# Patient Record
Sex: MALE | Race: WHITE | NOT HISPANIC OR LATINO | Employment: FULL TIME | URBAN - METROPOLITAN AREA
[De-identification: names, ages, dates, MRNs, and addresses within clinical notes are randomized per-mention and may not be internally consistent; named-entity substitution may affect disease eponyms.]

---

## 2018-05-15 ENCOUNTER — OFFICE VISIT (OUTPATIENT)
Dept: ENDOCRINOLOGY | Facility: HOSPITAL | Age: 68
End: 2018-05-15
Payer: MEDICARE

## 2018-05-15 VITALS
HEART RATE: 80 BPM | HEIGHT: 75 IN | WEIGHT: 251.6 LBS | DIASTOLIC BLOOD PRESSURE: 82 MMHG | SYSTOLIC BLOOD PRESSURE: 134 MMHG | BODY MASS INDEX: 31.28 KG/M2

## 2018-05-15 DIAGNOSIS — E89.0 POSTOPERATIVE HYPOTHYROIDISM: ICD-10-CM

## 2018-05-15 DIAGNOSIS — C73 PAPILLARY THYROID CARCINOMA (HCC): Primary | ICD-10-CM

## 2018-05-15 PROCEDURE — 99204 OFFICE O/P NEW MOD 45 MIN: CPT | Performed by: INTERNAL MEDICINE

## 2018-05-15 RX ORDER — LEVOTHYROXINE SODIUM 0.2 MG/1
200 TABLET ORAL DAILY
Qty: 90 TABLET | Refills: 3
Start: 2018-05-15 | End: 2018-05-15 | Stop reason: SDUPTHER

## 2018-05-15 RX ORDER — FINASTERIDE 5 MG/1
5 TABLET, FILM COATED ORAL DAILY
COMMUNITY
Start: 2018-04-29

## 2018-05-15 RX ORDER — LEVOTHYROXINE SODIUM 0.2 MG/1
200 TABLET ORAL DAILY
Qty: 90 TABLET | Refills: 3 | Status: SHIPPED | OUTPATIENT
Start: 2018-05-15 | End: 2019-05-18 | Stop reason: SDUPTHER

## 2018-05-15 RX ORDER — LEVOTHYROXINE SODIUM 25 MCG
TABLET ORAL
Qty: 30 TABLET | Refills: 0
Start: 2018-05-15 | End: 2018-05-15

## 2018-05-15 RX ORDER — LEVOTHYROXINE SODIUM 25 MCG
TABLET ORAL
COMMUNITY
Start: 2018-04-23 | End: 2018-05-15 | Stop reason: SDUPTHER

## 2018-05-15 NOTE — LETTER
May 15, 2018     Elevaashley Theodore   [address] [facility]  [facility] [address]  [address] [address]    Patient: Estuardo Mazariegos   YOB: 1950   Date of Visit: 5/15/2018       Dear Dr Sarita Stout: Thank you for referring Estuardo Mazariegos to me for evaluation  Below are my notes for this consultation  If you have questions, please do not hesitate to call me  I look forward to following your patient along with you  Sincerely,        Merline Bingham DO        CC: No Recipients  Merline Bingham DO  5/15/2018  1:30 PM  Sign at close encounter  5/15/2018    Assessment/Plan      Diagnoses and all orders for this visit:    Papillary thyroid carcinoma ([facility] [address] )  -     TSH, 3rd generation Lab Collect; Future  -     T4, free Lab Collect; Future  -     Thyroglobulin; Future  -     Thyroglobulin w/ab Lab Collect; Future  -     TSH, 3rd generation Lab Collect; Future  -     T4, free Lab Collect; Future  -     Thyroglobulin; Future  -     Anti-thyroglobulin antibody; Future  -     Comprehensive metabolic panel; Future    Postoperative hypothyroidism  -     Discontinue: levothyroxine (SYNTHROID) 200 mcg tablet; Take 1 tablet (200 mcg total) by mouth daily 1 tab daily  BRAND NECESSARY  -     Discontinue: SYNTHROID 25 MCG tablet; 1 tab daily except for Wednesdays and Sundays  BRAND NECESSARY  -     levothyroxine (SYNTHROID) 200 mcg tablet; Take 1 tablet (200 mcg total) by mouth daily 1 tab daily  BRAND NECESSARY  -     TSH, 3rd generation Lab Collect; Future  -     T4, free Lab Collect; Future  -     Thyroglobulin; Future  -     Anti-thyroglobulin antibody; Future  -     Comprehensive metabolic panel; Future    Other orders  -     finasteride (PROSCAR) 5 mg tablet; Take 5 mg by mouth daily  -     Discontinue: SYNTHROID 25 MCG tablet; Assessment/Plan:  1  Papillary thyroid cancer:  Surgical pathology listed below  Status post thyroidectomy and radioactive iodine  Thyroglobulin has remained undetectable    Has not had an ultrasound since about 2014 which has not shown any residual recurrent disease  I have suggested we back off on his TSH goal for TSH is 0 1-0 5  He will take Synthroid brand 200 mcg daily now  Repeat TSH, free T4, thyroglobulin antibody, thyroglobulin quantitative in about 3 months  Will call with these results for any additional adjustments  Plan to follow-up in 1 year with labs just prior including a TSH, free T4, thyroglobulin antibody, thyroglobulin quantitative, CMP  2   Postoperative hypothyroidism:  Plan per number 1  CC:  Thyroid cancer and hypothyroidism    History of Present Illness     HPI: Tiarra Du is a 74y o  year old male with history of thyroid nodules, thyroid cancer, postoperative hypothyroidism, hypertension, BPH who presents to [facility] care  Previous patient doctor Jaeger  The patient had thyroid nodules and had a 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  He then had another nodule that grew and was biopsied with concern for papillary thyroid cancer  He underwent thyroidectomy in 2011 at [facility]  Surgical pathology is listed below  He then received about 150 mCi of I 131 for him than ablation  He was then followed and has had no evidence of recurrent disease  He presents today feeling well denies any significant symptoms  He denies any symptoms of hypothyroidism, neck compression, hyperthyroidism  No family history of thyroid cancer  He takes Synthroid brand 200 mcg tablets and adds a 25 mcg tablet 5 days of the week  Review of Systems   Constitutional: Negative for chills, fatigue and fever  HENT: Negative for sore throat, trouble swallowing and voice change  Eyes: Negative for visual disturbance  Respiratory: Negative for shortness of breath  Cardiovascular: Negative for chest pain, palpitations and leg swelling  Gastrointestinal: Negative for abdominal pain, nausea and vomiting     Endocrine: Negative for heat intolerance, polydipsia and polyuria  Musculoskeletal: Negative for arthralgias and myalgias  Skin: Negative for rash  Neurological: Negative for dizziness, tremors and weakness  Hematological: Negative for adenopathy  Psychiatric/Behavioral: Negative for agitation and confusion  Historical Information   Past Medical History:   Diagnosis Date    Hypertension     Prostate enlargement      Past Surgical History:   Procedure Laterality Date    TOTAL THYROIDECTOMY       Social History   History   Alcohol Use    Yes     History   Drug Use No     History   Smoking Status    Former Smoker   Smokeless Tobacco    Never Used     Family History:   Family History   Problem Relation Age of Onset    No Known Problems Mother     No Known Problems Father        Meds/Allergies   Current Outpatient Prescriptions   Medication Sig Dispense Refill    amLODIPine (NORVASC) 10 mg tablet Take 10 mg by mouth daily   doxazosin (CARDURA) 4 mg tablet Take 4 mg by mouth daily at bedtime   finasteride (PROSCAR) 5 mg tablet Take 5 mg by mouth daily      pravastatin (PRAVACHOL) 20 mg tablet Take 20 mg by mouth daily   levothyroxine (SYNTHROID) 200 mcg tablet Take 1 tablet (200 mcg total) by mouth daily 1 tab daily  BRAND NECESSARY  90 tablet 3    ustekinumab (STELARA) 90 mg/mL subcutaneous injection Inject 90 mg under the skin once  No current facility-administered medications for this visit  No Known Allergies    Objective   Vitals: Blood pressure 134/82, pulse 80, height 6' 3" (1 905 m), weight 114 kg (251 lb 9 6 oz)  Invasive Devices     Drain            Urethral Catheter Coude 625 days                Physical Exam   Constitutional: He is oriented to person, place, and time  He appears well-developed and well-nourished  No distress  HENT:   Head: Normocephalic and atraumatic  Mouth/Throat: No oropharyngeal exudate     Eyes: Conjunctivae and EOM are normal  Pupils are equal, round, and reactive to light  No scleral icterus  Neck: Normal range of motion  Neck supple  No nodules in thyroid bed palpable  Cardiovascular: Normal rate and regular rhythm  No murmur heard  Pulmonary/Chest: Effort normal and breath sounds normal  He has no wheezes  Abdominal: Soft  Bowel sounds are normal  He exhibits no distension  There is no tenderness  Musculoskeletal: Normal range of motion  He exhibits edema (trace b/l )  Lymphadenopathy:     He has no cervical adenopathy  Neurological: He is alert and oriented to person, place, and time  He exhibits normal muscle tone  Skin: Skin is warm and dry  No rash noted  He is not diaphoretic  Psychiatric: He has a normal mood and affect  His behavior is normal        The history was obtained from the review of the chart and from the patient  Lab Results:      Labs from [address] [place] [address] done on 05/04/2018:  Glucose 94, BUN 16, creatinine 0 4, GFR 85, sodium 140, potassium 4 2, calcium 8 9, albumin 3 9, liver function within normal limits, TSH 0 03, free T4 2 2, thyroglobulin antibodies less than 1, thyroglobulin quantitative less than 0 1     12/10/14:  NECK ULTRASOUND   INDICATION-   History of thyroid carcinoma  COMPARISON-  Lymph node mapping November 12, 2013  FINDINGS-   Ultrasound of the thyroidectomy bed and cervical lymph node chains was   again performed with a high frequency linear transducer  There is no suspicion of recurrent mass in the thyroidectomy bed  Lymph nodes maintain normal morphologic contour, echogenicity and short   axis dimensions of less than 0 7 cm  No evidence for   microcalcification or focal nodularity  IMPRESSION-     No evidence of recurrent or metastatic disease     Transcribed on- [id number]           [facility], RAD DO        [place] Radiologist- BARBY Gibson DO        Electronically Signed- BARBY Gibson DO        Released Date Time- 12/10/14 8718     Surgical pathology from [place] on 05/16/2011:  3 5 cm right lobe papillary thyroid cancer confined to the thyroid parenchyma without angiolymphatic invasion and with surgical resection margins free of cancer  There also follicular adenoma and hyperplastic nodules  This was unifocal tumor  Nose classic papillary  No tumor capsular invasion or lymphovascular invasion  On 07/21/2011 at [facility] he had a post I 131 therapy whole-body scan that showed residual thyroid tissue in the right neck  No distant Mets seen  No future appointments

## 2018-05-15 NOTE — PROGRESS NOTES
5/15/2018    Assessment/Plan      Diagnoses and all orders for this visit:    Papillary thyroid carcinoma ([facility] [address] )  -     TSH, 3rd generation Lab Collect; Future  -     T4, free Lab Collect; Future  -     Thyroglobulin; Future  -     Thyroglobulin w/ab Lab Collect; Future  -     TSH, 3rd generation Lab Collect; Future  -     T4, free Lab Collect; Future  -     Thyroglobulin; Future  -     Anti-thyroglobulin antibody; Future  -     Comprehensive metabolic panel; Future    Postoperative hypothyroidism  -     Discontinue: levothyroxine (SYNTHROID) 200 mcg tablet; Take 1 tablet (200 mcg total) by mouth daily 1 tab daily  BRAND NECESSARY  -     Discontinue: SYNTHROID 25 MCG tablet; 1 tab daily except for Wednesdays and Sundays  BRAND NECESSARY  -     levothyroxine (SYNTHROID) 200 mcg tablet; Take 1 tablet (200 mcg total) by mouth daily 1 tab daily  BRAND NECESSARY  -     TSH, 3rd generation Lab Collect; Future  -     T4, free Lab Collect; Future  -     Thyroglobulin; Future  -     Anti-thyroglobulin antibody; Future  -     Comprehensive metabolic panel; Future    Other orders  -     finasteride (PROSCAR) 5 mg tablet; Take 5 mg by mouth daily  -     Discontinue: SYNTHROID 25 MCG tablet; Assessment/Plan:  1  Papillary thyroid cancer:  Surgical pathology listed below  Status post thyroidectomy and radioactive iodine  Thyroglobulin has remained undetectable  Has not had an ultrasound since about 2014 which has not shown any residual recurrent disease  I have suggested we back off on his TSH goal for TSH is 0 1-0 5  He will take Synthroid brand 200 mcg daily now  Repeat TSH, free T4, thyroglobulin antibody, thyroglobulin quantitative in about 3 months  Will call with these results for any additional adjustments  Plan to follow-up in 1 year with labs just prior including a TSH, free T4, thyroglobulin antibody, thyroglobulin quantitative, CMP  2   Postoperative hypothyroidism:  Plan per number 1        CC: Thyroid cancer and hypothyroidism    History of Present Illness     HPI: Delfin Carlton is a 74y o  year old male with history of thyroid nodules, thyroid cancer, postoperative hypothyroidism, hypertension, BPH who presents to establish care  Previous patient doctor Jaeger  The patient had thyroid nodules and had a 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  He then had another nodule that grew and was biopsied with concern for papillary thyroid cancer  He underwent thyroidectomy in 2011 at [facility]  Surgical pathology is listed below  He then received about 150 mCi of I 131 for him than ablation  He was then followed and has had no evidence of recurrent disease  He presents today feeling well denies any significant symptoms  He denies any symptoms of hypothyroidism, neck compression, hyperthyroidism  No family history of thyroid cancer  He takes Synthroid brand 200 mcg tablets and adds a 25 mcg tablet 5 days of the week  Review of Systems   Constitutional: Negative for chills, fatigue and fever  HENT: Negative for sore throat, trouble swallowing and voice change  Eyes: Negative for visual disturbance  Respiratory: Negative for shortness of breath  Cardiovascular: Negative for chest pain, palpitations and leg swelling  Gastrointestinal: Negative for abdominal pain, nausea and vomiting  Endocrine: Negative for heat intolerance, polydipsia and polyuria  Musculoskeletal: Negative for arthralgias and myalgias  Skin: Negative for rash  Neurological: Negative for dizziness, tremors and weakness  Hematological: Negative for adenopathy  Psychiatric/Behavioral: Negative for agitation and confusion         Historical Information   Past Medical History:   Diagnosis Date    Hypertension     Prostate enlargement      Past Surgical History:   Procedure Laterality Date    TOTAL THYROIDECTOMY       Social History   History   Alcohol Use    Yes     History Drug Use No     History   Smoking Status    Former Smoker   Smokeless Tobacco    Never Used     Family History:   Family History   Problem Relation Age of Onset    No Known Problems Mother     No Known Problems Father        Meds/Allergies   Current Outpatient Prescriptions   Medication Sig Dispense Refill    amLODIPine (NORVASC) 10 mg tablet Take 10 mg by mouth daily   doxazosin (CARDURA) 4 mg tablet Take 4 mg by mouth daily at bedtime   finasteride (PROSCAR) 5 mg tablet Take 5 mg by mouth daily      pravastatin (PRAVACHOL) 20 mg tablet Take 20 mg by mouth daily   levothyroxine (SYNTHROID) 200 mcg tablet Take 1 tablet (200 mcg total) by mouth daily 1 tab daily  BRAND NECESSARY  90 tablet 3    ustekinumab (STELARA) 90 mg/mL subcutaneous injection Inject 90 mg under the skin once  No current facility-administered medications for this visit  No Known Allergies    Objective   Vitals: Blood pressure 134/82, pulse 80, height 6' 3" (1 905 m), weight 114 kg (251 lb 9 6 oz)  Invasive Devices     Drain            Urethral Catheter Coude 625 days                Physical Exam   Constitutional: He is oriented to person, place, and time  He appears well-developed and well-nourished  No distress  HENT:   Head: Normocephalic and atraumatic  Mouth/Throat: No oropharyngeal exudate  Eyes: Conjunctivae and EOM are normal  Pupils are equal, round, and reactive to light  No scleral icterus  Neck: Normal range of motion  Neck supple  No nodules in thyroid bed palpable  Cardiovascular: Normal rate and regular rhythm  No murmur heard  Pulmonary/Chest: Effort normal and breath sounds normal  He has no wheezes  Abdominal: Soft  Bowel sounds are normal  He exhibits no distension  There is no tenderness  Musculoskeletal: Normal range of motion  He exhibits edema (trace b/l )  Lymphadenopathy:     He has no cervical adenopathy     Neurological: He is alert and oriented to person, place, and time  He exhibits normal muscle tone  Skin: Skin is warm and dry  No rash noted  He is not diaphoretic  Psychiatric: He has a normal mood and affect  His behavior is normal        The history was obtained from the review of the chart and from the patient  Lab Results:      Labs from [address] [place] [address] done on 05/04/2018:  Glucose 94, BUN 16, creatinine 0 4, GFR 85, sodium 140, potassium 4 2, calcium 8 9, albumin 3 9, liver function within normal limits, TSH 0 03, free T4 2 2, thyroglobulin antibodies less than 1, thyroglobulin quantitative less than 0 1     12/10/14:  NECK ULTRASOUND   INDICATION-   History of thyroid carcinoma  COMPARISON-  Lymph node mapping November 12, 2013  FINDINGS-   Ultrasound of the thyroidectomy bed and cervical lymph node chains was   again performed with a high frequency linear transducer  There is no suspicion of recurrent mass in the thyroidectomy bed  Lymph nodes maintain normal morphologic contour, echogenicity and short   axis dimensions of less than 0 7 cm  No evidence for   microcalcification or focal nodularity  IMPRESSION-     No evidence of recurrent or metastatic disease  Transcribed on- [id number]           [address] [facility], RAD DO        [place] Radiologist- BARBY Moss DO        Electronically Signed- BARBY Moss DO        Released Date Time- 12/10/14 1118     Surgical pathology from [facility] on 05/16/2011:  3 5 cm right lobe papillary thyroid cancer confined to the thyroid parenchyma without angiolymphatic invasion and with surgical resection margins free of cancer  There also follicular adenoma and hyperplastic nodules  This was unifocal tumor  Nose classic papillary  No tumor capsular invasion or lymphovascular invasion  On 07/21/2011 at [facility] he had a post I 131 therapy whole-body scan that showed residual thyroid tissue in the right neck  No distant Mets seen  No future appointments

## 2018-09-10 LAB
T4 FREE SERPL-MCNC: 1.9 NG/DL (ref 0.8–1.8)
THYROGLOB AB SERPL-ACNC: <0.1 NG/ML
THYROGLOB AB SERPL-ACNC: <1 IU/ML
TSH SERPL-ACNC: 0.14 MIU/L (ref 0.4–4.5)

## 2019-05-10 LAB
ALBUMIN SERPL-MCNC: 4.1 G/DL (ref 3.6–5.1)
ALBUMIN/GLOB SERPL: 2 (CALC) (ref 1–2.5)
ALP SERPL-CCNC: 59 U/L (ref 40–115)
ALT SERPL-CCNC: 12 U/L (ref 9–46)
AST SERPL-CCNC: 13 U/L (ref 10–35)
BILIRUB SERPL-MCNC: 1.1 MG/DL (ref 0.2–1.2)
BUN SERPL-MCNC: 19 MG/DL (ref 7–25)
BUN/CREAT SERPL: ABNORMAL (CALC) (ref 6–22)
CALCIUM SERPL-MCNC: 9.1 MG/DL (ref 8.6–10.3)
CHLORIDE SERPL-SCNC: 104 MMOL/L (ref 98–110)
CO2 SERPL-SCNC: 25 MMOL/L (ref 20–32)
CREAT SERPL-MCNC: 1.08 MG/DL (ref 0.7–1.25)
GLOBULIN SER CALC-MCNC: 2.1 G/DL (CALC) (ref 1.9–3.7)
GLUCOSE SERPL-MCNC: 100 MG/DL (ref 65–99)
POTASSIUM SERPL-SCNC: 4.4 MMOL/L (ref 3.5–5.3)
PROT SERPL-MCNC: 6.2 G/DL (ref 6.1–8.1)
SL AMB EGFR AFRICAN AMERICAN: 82 ML/MIN/1.73M2
SL AMB EGFR NON AFRICAN AMERICAN: 71 ML/MIN/1.73M2
SODIUM SERPL-SCNC: 139 MMOL/L (ref 135–146)
T4 FREE SERPL-MCNC: 1.9 NG/DL (ref 0.8–1.8)
THYROGLOB AB SERPL-ACNC: <0.1 NG/ML
THYROGLOB AB SERPL-ACNC: <1 IU/ML
TSH SERPL-ACNC: 0.09 MIU/L (ref 0.4–4.5)

## 2019-05-18 DIAGNOSIS — E89.0 POSTOPERATIVE HYPOTHYROIDISM: ICD-10-CM

## 2019-05-20 RX ORDER — LEVOTHYROXINE SODIUM 200 MCG
TABLET ORAL
Qty: 90 TABLET | Refills: 3 | Status: SHIPPED | OUTPATIENT
Start: 2019-05-20 | End: 2019-05-24 | Stop reason: SDUPTHER

## 2019-05-24 ENCOUNTER — OFFICE VISIT (OUTPATIENT)
Dept: ENDOCRINOLOGY | Facility: HOSPITAL | Age: 69
End: 2019-05-24
Payer: MEDICARE

## 2019-05-24 VITALS
HEART RATE: 84 BPM | DIASTOLIC BLOOD PRESSURE: 62 MMHG | BODY MASS INDEX: 30.81 KG/M2 | SYSTOLIC BLOOD PRESSURE: 114 MMHG | WEIGHT: 247.8 LBS | HEIGHT: 75 IN

## 2019-05-24 DIAGNOSIS — C73 PAPILLARY THYROID CARCINOMA (HCC): Primary | ICD-10-CM

## 2019-05-24 DIAGNOSIS — E89.0 POSTOPERATIVE HYPOTHYROIDISM: ICD-10-CM

## 2019-05-24 PROCEDURE — 99214 OFFICE O/P EST MOD 30 MIN: CPT | Performed by: INTERNAL MEDICINE

## 2019-05-24 RX ORDER — METHOCARBAMOL 750 MG/1
TABLET ORAL
Refills: 3 | COMMUNITY
Start: 2019-04-16 | End: 2020-05-28

## 2019-05-24 RX ORDER — FLUTICASONE FUROATE, UMECLIDINIUM BROMIDE AND VILANTEROL TRIFENATATE 100; 62.5; 25 UG/1; UG/1; UG/1
POWDER RESPIRATORY (INHALATION)
Refills: 5 | COMMUNITY
Start: 2019-05-07

## 2019-05-24 RX ORDER — LEVOTHYROXINE SODIUM 200 MCG
200 TABLET ORAL DAILY
Qty: 90 TABLET | Refills: 3
Start: 2019-05-24 | End: 2020-05-28 | Stop reason: SDUPTHER

## 2019-07-04 LAB
T4 FREE SERPL-MCNC: 1.8 NG/DL (ref 0.8–1.8)
TSH SERPL-ACNC: 0.38 MIU/L (ref 0.4–4.5)

## 2019-12-22 ENCOUNTER — OFFICE VISIT (OUTPATIENT)
Dept: URGENT CARE | Facility: CLINIC | Age: 69
End: 2019-12-22

## 2019-12-22 VITALS
BODY MASS INDEX: 31.83 KG/M2 | HEIGHT: 75 IN | SYSTOLIC BLOOD PRESSURE: 114 MMHG | HEART RATE: 71 BPM | DIASTOLIC BLOOD PRESSURE: 80 MMHG | TEMPERATURE: 98.5 F | RESPIRATION RATE: 16 BRPM | OXYGEN SATURATION: 98 % | WEIGHT: 256 LBS

## 2019-12-22 DIAGNOSIS — H10.31 ACUTE CONJUNCTIVITIS OF RIGHT EYE, UNSPECIFIED ACUTE CONJUNCTIVITIS TYPE: Primary | ICD-10-CM

## 2019-12-22 RX ORDER — GENTAMICIN SULFATE 3 MG/ML
1 SOLUTION/ DROPS OPHTHALMIC EVERY 4 HOURS
Qty: 5 ML | Refills: 0 | Status: SHIPPED | OUTPATIENT
Start: 2019-12-22

## 2019-12-22 NOTE — PROGRESS NOTES
[facility]        NAME: Noah Baltazar is a 81 y o  male  : 1950    MRN: [id number]  DATE: 2019  TIME: 12:04 PM    Assessment and Plan   Acute conjunctivitis of right eye, unspecified acute conjunctivitis type [H10 31]  1  Acute conjunctivitis of right eye, unspecified acute conjunctivitis type  gentamicin (GARAMYCIN) 0 3 % ophthalmic solution         Patient Instructions     Wash hands frequently  Do not let dropper touch your eye  Use x 5 days  Follow up with PCP in 3-5 days  Proceed to  ER if symptoms worsen  Chief Complaint     Chief Complaint   Patient presents with    Eye Problem     Last night right eye started bothering him  today woke up all red and irritated  History of Present Illness       HPI   Reports redness, itchy and sensitivity to light on the right eye  Started this morning after waking up from sleep  Denies trauma  Not using contacts  Review of Systems   Review of Systems   Constitutional: Negative for chills and fever  HENT: Negative for rhinorrhea  Eyes: Positive for photophobia, discharge, redness and itching  Respiratory: Negative for cough  Current Medications       Current Outpatient Medications:     amLODIPine (NORVASC) 10 mg tablet, Take 10 mg by mouth daily  , Disp: , Rfl:     D3-50 56462 units capsule, , Disp: , Rfl: 3    doxazosin (CARDURA) 4 mg tablet, Take 4 mg by mouth daily at bedtime  , Disp: , Rfl:     finasteride (PROSCAR) 5 mg tablet, Take 5 mg by mouth daily, Disp: , Rfl:     pravastatin (PRAVACHOL) 20 mg tablet, Take 20 mg by mouth daily  , Disp: , Rfl:     SYNTHROID 200 MCG tablet, Take 1 tablet (200 mcg total) by mouth daily 1 tab 6 days of the week  0 5 tab    BRAND NECESSARY , Disp: 90 tablet, Rfl: 3    gentamicin (GARAMYCIN) 0 3 % ophthalmic solution, Administer 1 drop to the right eye every 4 (four) hours, Disp: 5 mL, Rfl: 0    TRELEGY ELLIPTA 100-62 5-25 MCG/INH inhaler, , Disp: , Rfl: 5   ustekinumab (STELARA) 90 mg/mL subcutaneous injection, Inject 90 mg under the skin once , Disp: , Rfl:     Current Allergies     Allergies as of 12/22/2019    (No Known Allergies)            The following portions of the patient's history were reviewed and updated as appropriate: allergies, current medications, past family history, past medical history, past social history, past surgical history and problem list      Past Medical History:   Diagnosis Date    Hypertension     Prostate enlargement        Past Surgical History:   Procedure Laterality Date    TOTAL THYROIDECTOMY         Family History   Problem Relation Age of Onset    No Known Problems Mother     No Known Problems Father          Medications have been verified  Objective   /80   Pulse 71   Temp 98 5 °F (36 9 °C)   Resp 16   Ht 6' 3" (1 905 m)   Wt 116 kg (256 lb)   SpO2 98%   BMI 32 00 kg/m²        Physical Exam     Physical Exam   Eyes: Pupils are equal, round, and reactive to light  EOM are normal  Right eye exhibits discharge (clear to yellwoish, moderate amount, also watery)  Left eye exhibits no discharge     Mild swelling of the right upper and lower lids

## 2019-12-22 NOTE — PATIENT INSTRUCTIONS

## 2020-05-12 LAB
ALBUMIN SERPL-MCNC: 4.1 G/DL (ref 3.8–4.8)
ALBUMIN/GLOB SERPL: 2.2 {RATIO} (ref 1.2–2.2)
ALP SERPL-CCNC: 58 IU/L (ref 39–117)
ALT SERPL-CCNC: 15 IU/L (ref 0–44)
AST SERPL-CCNC: 17 IU/L (ref 0–40)
BILIRUB SERPL-MCNC: 0.6 MG/DL (ref 0–1.2)
BUN SERPL-MCNC: 16 MG/DL (ref 8–27)
BUN/CREAT SERPL: 16 (ref 10–24)
CALCIUM SERPL-MCNC: 9.3 MG/DL (ref 8.6–10.2)
CHLORIDE SERPL-SCNC: 103 MMOL/L (ref 96–106)
CO2 SERPL-SCNC: 24 MMOL/L (ref 20–29)
CREAT SERPL-MCNC: 1.02 MG/DL (ref 0.76–1.27)
GLOBULIN SER-MCNC: 1.9 G/DL (ref 1.5–4.5)
GLUCOSE SERPL-MCNC: 76 MG/DL (ref 65–99)
POTASSIUM SERPL-SCNC: 4.2 MMOL/L (ref 3.5–5.2)
PROT SERPL-MCNC: 6 G/DL (ref 6–8.5)
SL AMB EGFR AFRICAN AMERICAN: 88 ML/MIN/1.73
SL AMB EGFR NON AFRICAN AMERICAN: 76 ML/MIN/1.73
SODIUM SERPL-SCNC: 139 MMOL/L (ref 134–144)
T4 FREE SERPL-MCNC: 2.21 NG/DL (ref 0.82–1.77)
THYROGLOB AB SERPL-ACNC: <1 IU/ML (ref 0–0.9)
THYROGLOB SERPL-MCNC: <0.1 NG/ML (ref 1.4–29.2)
TSH SERPL DL<=0.005 MIU/L-ACNC: 0.3 UIU/ML (ref 0.45–4.5)

## 2020-05-27 ENCOUNTER — TELEPHONE (OUTPATIENT)
Dept: ENDOCRINOLOGY | Facility: HOSPITAL | Age: 70
End: 2020-05-27

## 2020-05-28 ENCOUNTER — OFFICE VISIT (OUTPATIENT)
Dept: ENDOCRINOLOGY | Facility: HOSPITAL | Age: 70
End: 2020-05-28
Payer: MEDICARE

## 2020-05-28 VITALS
BODY MASS INDEX: 32.03 KG/M2 | HEIGHT: 75 IN | HEART RATE: 88 BPM | WEIGHT: 257.6 LBS | DIASTOLIC BLOOD PRESSURE: 80 MMHG | TEMPERATURE: 98 F | SYSTOLIC BLOOD PRESSURE: 140 MMHG

## 2020-05-28 DIAGNOSIS — C73 PAPILLARY THYROID CARCINOMA (HCC): Primary | ICD-10-CM

## 2020-05-28 DIAGNOSIS — E89.0 POSTOPERATIVE HYPOTHYROIDISM: ICD-10-CM

## 2020-05-28 PROCEDURE — 99214 OFFICE O/P EST MOD 30 MIN: CPT | Performed by: INTERNAL MEDICINE

## 2020-05-28 RX ORDER — LEVOTHYROXINE SODIUM 200 MCG
200 TABLET ORAL DAILY
Qty: 90 TABLET | Refills: 3 | Status: SHIPPED | OUTPATIENT
Start: 2020-05-28 | End: 2021-06-02 | Stop reason: SDUPTHER

## 2020-05-28 RX ORDER — ROSUVASTATIN CALCIUM 20 MG/1
20 TABLET, COATED ORAL DAILY
COMMUNITY
Start: 2020-04-13

## 2021-03-04 DIAGNOSIS — Z23 ENCOUNTER FOR IMMUNIZATION: ICD-10-CM

## 2021-05-26 LAB
ALBUMIN SERPL-MCNC: 4.2 G/DL (ref 3.6–5.1)
ALBUMIN/GLOB SERPL: 2.1 (CALC) (ref 1–2.5)
ALP SERPL-CCNC: 51 U/L (ref 35–144)
ALT SERPL-CCNC: 14 U/L (ref 9–46)
AST SERPL-CCNC: 16 U/L (ref 10–35)
BILIRUB SERPL-MCNC: 0.9 MG/DL (ref 0.2–1.2)
BUN SERPL-MCNC: 19 MG/DL (ref 7–25)
BUN/CREAT SERPL: NORMAL (CALC) (ref 6–22)
CALCIUM SERPL-MCNC: 9 MG/DL (ref 8.6–10.3)
CHLORIDE SERPL-SCNC: 107 MMOL/L (ref 98–110)
CO2 SERPL-SCNC: 26 MMOL/L (ref 20–32)
CREAT SERPL-MCNC: 1.05 MG/DL (ref 0.7–1.25)
GLOBULIN SER CALC-MCNC: 2 G/DL (CALC) (ref 1.9–3.7)
GLUCOSE SERPL-MCNC: 95 MG/DL (ref 65–99)
POTASSIUM SERPL-SCNC: 4.5 MMOL/L (ref 3.5–5.3)
PROT SERPL-MCNC: 6.2 G/DL (ref 6.1–8.1)
SL AMB EGFR AFRICAN AMERICAN: 84 ML/MIN/1.73M2
SL AMB EGFR NON AFRICAN AMERICAN: 73 ML/MIN/1.73M2
SODIUM SERPL-SCNC: 139 MMOL/L (ref 135–146)
T4 FREE SERPL-MCNC: 1.9 NG/DL (ref 0.8–1.8)
THYROGLOB AB SERPL-ACNC: <0.1 NG/ML
THYROGLOB AB SERPL-ACNC: <1 IU/ML
TSH SERPL-ACNC: 0.1 MIU/L (ref 0.4–4.5)

## 2021-06-02 ENCOUNTER — OFFICE VISIT (OUTPATIENT)
Dept: ENDOCRINOLOGY | Facility: HOSPITAL | Age: 71
End: 2021-06-02
Payer: MEDICARE

## 2021-06-02 ENCOUNTER — TELEPHONE (OUTPATIENT)
Dept: ADMINISTRATIVE | Facility: OTHER | Age: 71
End: 2021-06-02

## 2021-06-02 VITALS
BODY MASS INDEX: 34.01 KG/M2 | HEIGHT: 74 IN | DIASTOLIC BLOOD PRESSURE: 70 MMHG | SYSTOLIC BLOOD PRESSURE: 126 MMHG | HEART RATE: 76 BPM | WEIGHT: 265 LBS

## 2021-06-02 DIAGNOSIS — C73 PAPILLARY THYROID CARCINOMA (HCC): Primary | ICD-10-CM

## 2021-06-02 DIAGNOSIS — E89.0 POSTOPERATIVE HYPOTHYROIDISM: ICD-10-CM

## 2021-06-02 PROCEDURE — 99214 OFFICE O/P EST MOD 30 MIN: CPT | Performed by: INTERNAL MEDICINE

## 2021-06-02 RX ORDER — LEVOTHYROXINE SODIUM 200 MCG
200 TABLET ORAL DAILY
Qty: 90 TABLET | Refills: 3 | Status: SHIPPED | OUTPATIENT
Start: 2021-06-02 | End: 2022-05-31 | Stop reason: SDUPTHER

## 2021-06-02 NOTE — TELEPHONE ENCOUNTER
----- Message from [facility] sent at 6/2/2021  7:17 AM EDT -----  Regarding: colonoscopy  06/02/21 7:18 AM    Hello, our patient Katty Terrell has had a Colonoscopy through [facility]       Thank you,  [address] [place] [address] [facility] Linda Weinstein

## 2021-06-02 NOTE — PROGRESS NOTES
6/2/2021    Assessment/Plan      Diagnoses and all orders for this visit:    Papillary thyroid carcinoma (HCC)    Postoperative hypothyroidism        Assessment/Plan:    1  Thyroid cancer:  Thyroglobulin remains undetectable without any signs of residual recurrent disease  Continue to monitor annually  2  Postoperative hypothyroidism:  TSH is at a reasonable goal of 0 1-0 5  Discussed with the patient that we will likely relax this goal to 0 5-2 if thyroglobulin remains undetectable  Clinically he is without signs of hyperthyroidism or hypothyroidism  Continue current regimen for now  CC: Follow-up    History of Present Illness     HPI: Judy Soliman is a 78y o  year old male with history of thyroid cancer and postoperative hypothyroidism who presents for a follow-up appointment  In the past, he had an initial surgery for thyroid nodules with pathology showing benign follicular adenoma  He had another nodule in the remaining portion of the thyroid that grew was biopsied and there was a concern for papillary thyroid cancer  He underwent thyroidectomy in 2011 at [facility] with surgical pathology showing a 3 5 cm right lobe papillary thyroid cancer confined to the thyroid without angiolymphatic invasion and with surgical margins free of cancer  There was also a follicular adenoma and hyperplastic nodule  The papillary thyroid cancer was classic variant  There was no capsular invasion or lymphovascular invasion noted  He then received 150 mCi of I 131  In July of 2011 at [facility] a post I 131 whole-body scan showed residual thyroid tissue without any distant metastasis  For hypothyroidism he continues on Synthroid brand   200 mcg tablets and takes 1 tablet 6 days a week but only half tablet on Sundays  She presents today overall feeling well  No neck compressive symptoms  Denies any palpitations, tachycardia, heat intolerance, sweats      Review of Systems   Constitutional: Negative for fatigue  HENT: Negative for trouble swallowing and voice change  Eyes: Negative for visual disturbance  Respiratory: Negative for shortness of breath  Cardiovascular: Negative for palpitations and leg swelling  Gastrointestinal: Negative for abdominal pain, nausea and vomiting  Endocrine: Negative for polydipsia and polyuria  Musculoskeletal: Negative for arthralgias and myalgias  Skin: Negative for rash  Neurological: Negative for dizziness, tremors and weakness  Hematological: Negative for adenopathy  Psychiatric/Behavioral: Negative for agitation and confusion  Historical Information   Past Medical History:   Diagnosis Date    Hypertension     Prostate enlargement      Past Surgical History:   Procedure Laterality Date    TOTAL THYROIDECTOMY       Social History   Social History     Substance and Sexual Activity   Alcohol Use Yes     Social History     Substance and Sexual Activity   Drug Use No     Social History     Tobacco Use   Smoking Status Former Smoker   Smokeless Tobacco Never Used     Family History:   Family History   Problem Relation Age of Onset    No Known Problems Mother     No Known Problems Father        Meds/Allergies   Current Outpatient Medications   Medication Sig Dispense Refill    amLODIPine (NORVASC) 10 mg tablet Take 10 mg by mouth daily   doxazosin (CARDURA) 4 mg tablet Take 4 mg by mouth daily at bedtime   finasteride (PROSCAR) 5 mg tablet Take 5 mg by mouth daily      gentamicin (GARAMYCIN) 0 3 % ophthalmic solution Administer 1 drop to the right eye every 4 (four) hours 5 mL 0    rosuvastatin (CRESTOR) 20 MG tablet Take 20 mg by mouth daily      SYNTHROID 200 MCG tablet Take 1 tablet (200 mcg total) by mouth daily 1 tab 6 days of the week  0 5 tab Sunday  BRAND NECESSARY  90 tablet 3    TRELEGY ELLIPTA 100-62 5-25 MCG/INH inhaler   5     No current facility-administered medications for this visit        No Known Allergies    Objective Vitals: There were no vitals taken for this visit  Invasive Devices     Drain            Urethral Catheter Coude 1738 days                Physical Exam  Vitals signs reviewed  Constitutional:       General: He is not in acute distress  Appearance: He is well-developed  He is not diaphoretic  HENT:      Head: Normocephalic and atraumatic  Eyes:      Conjunctiva/sclera: Conjunctivae normal       Pupils: Pupils are equal, round, and reactive to light  Neck:      Musculoskeletal: Normal range of motion and neck supple  Thyroid: No thyromegaly  Cardiovascular:      Rate and Rhythm: Normal rate and regular rhythm  Pulmonary:      Effort: Pulmonary effort is normal  No respiratory distress  Breath sounds: Normal breath sounds  Abdominal:      General: Bowel sounds are normal       Palpations: Abdomen is soft  Musculoskeletal: Normal range of motion  Lymphadenopathy:      Cervical: No cervical adenopathy  Skin:     General: Skin is warm and dry  Findings: No rash  Neurological:      Mental Status: He is alert and oriented to person, place, and time  Motor: No abnormal muscle tone  Psychiatric:         Behavior: Behavior normal          The history was obtained from the review of the chart and from the patient      Lab Results:      Recent Results (from the past 42335 hour(s))   Comprehensive metabolic panel    Collection Time: 05/11/20 12:05 PM   Result Value Ref Range    Glucose, Random 76 65 - 99 mg/dL    BUN 16 8 - 27 mg/dL    Creatinine 1 02 0 76 - 1 27 mg/dL    eGFR Non  76 >59 mL/min/1 73    eGFR  88 >59 mL/min/1 73    SL AMB BUN/CREATININE RATIO 16 10 - 24    Sodium 139 134 - 144 mmol/L    Potassium 4 2 3 5 - 5 2 mmol/L    Chloride 103 96 - 106 mmol/L    CO2 24 20 - 29 mmol/L    CALCIUM 9 3 8 6 - 10 2 mg/dL    Protein, Total 6 0 6 0 - 8 5 g/dL    Albumin 4 1 3 8 - 4 8 g/dL    Globulin, Total 1 9 1 5 - 4 5 g/dL    Albumin/Globulin Ratio 2  2 1 2 - 2 2    TOTAL BILIRUBIN 0 6 0 0 - 1 2 mg/dL    Alk Phos Isoenzymes 58 39 - 117 IU/L    AST 17 0 - 40 IU/L    ALT 15 0 - 44 IU/L   T4, free    Collection Time: 05/11/20 12:05 PM   Result Value Ref Range    Free t4 2 21 (H) 0 82 - 1 77 ng/dL   TSH, 3rd generation    Collection Time: 05/11/20 12:05 PM   Result Value Ref Range    TSH 0 301 (L) 0 450 - 4 500 uIU/mL   TGAB+THYROGLOBULIN,PAPITO OR LCMS    Collection Time: 05/11/20 12:05 PM   Result Value Ref Range    Thyroglobulin Ab <1 0 0 0 - 0 9 IU/mL   Thyroglobulin by PAPITO    Collection Time: 05/11/20 12:05 PM   Result Value Ref Range    Thyroglobulin-PAPITO <0 1 (L) 1 4 - 29 2 ng/mL   Comprehensive metabolic panel    Collection Time: 05/25/21  8:57 AM   Result Value Ref Range    Glucose, Random 95 65 - 99 mg/dL    BUN 19 7 - 25 mg/dL    Creatinine 1 05 0 70 - 1 25 mg/dL    eGFR Non  73 > OR = 60 mL/min/1 73m2    eGFR  84 > OR = 60 mL/min/1 73m2    SL AMB BUN/CREATININE RATIO NOT APPLICABLE 6 - 22 (calc)    Sodium 139 135 - 146 mmol/L    Potassium 4 5 3 5 - 5 3 mmol/L    Chloride 107 98 - 110 mmol/L    CO2 26 20 - 32 mmol/L    Calcium 9 0 8 6 - 10 3 mg/dL    Protein, Total 6 2 6 1 - 8 1 g/dL    Albumin 4 2 3 6 - 5 1 g/dL    Globulin 2 0 1 9 - 3 7 g/dL (calc)    Albumin/Globulin Ratio 2 1 1 0 - 2 5 (calc)    TOTAL BILIRUBIN 0 9 0 2 - 1 2 mg/dL    Alkaline Phosphatase 51 35 - 144 U/L    AST 16 10 - 35 U/L    ALT 14 9 - 46 U/L   T4, free    Collection Time: 05/25/21  8:57 AM   Result Value Ref Range    Free t4 1 9 (H) 0 8 - 1 8 ng/dL   TSH, 3rd generation    Collection Time: 05/25/21  8:57 AM   Result Value Ref Range    TSH 0 10 (L) 0 40 - 4 50 mIU/L   Thyroglobulin Panel    Collection Time: 05/25/21  8:57 AM   Result Value Ref Range    Thyroglobulin Ab <1 < or = 1 IU/mL    Thyroglobulin <0 1 (L) ng/mL         Future Appointments   Date Time Provider Israel Toro   6/2/2021  7:30 AM Cookie Barrera DO [facility] Portions of the record may have been created with voice recognition software  Occasional wrong word or "sound a like" substitutions may have occurred due to the inherent limitations of voice recognition software  Read the chart carefully and recognize, using context, where substitutions have occurred

## 2021-06-02 NOTE — LETTER
Procedure Request Form: Colonoscopy      Date Requested: 21  Patient: Manjit Lundberg  Patient : 1950   Referring Provider: Caldwell Oyster, DO        Date of Procedure ______________________________       The above patient has informed us that they have completed their   most recent Colonoscopy at your facility  Please complete   this form and attach all corresponding procedure reports/results  Comments __________________________________________________________  ____________________________________________________________________  ____________________________________________________________________  ____________________________________________________________________    Facility Completing Procedure _________________________________________    Form Completed By (print name) _______________________________________      Signature __________________________________________________________      These reports are needed for  compliance    Please fax this completed form and a copy of the procedure report to our office located at [place] [address] as soon as possible to [phone] [facility]: Phone [phone]    We thank you for your assistance in treating our mutual patient

## 2021-06-03 NOTE — TELEPHONE ENCOUNTER
Upon review of the In Basket request and the patient's chart, initial outreach has been made via fax, please see Contacts section for details       Thank you  Raad Gross

## 2021-06-04 NOTE — TELEPHONE ENCOUNTER
Upon review of the In Basket request we were able to locate, review, and update the patient chart as requested for CRC: Colonoscopy  Any additional questions or concerns should be emailed to the Practice Liaisons via [email or web address] email, please do not reply via In Basket      Thank you  Denise Ernst

## 2022-05-31 DIAGNOSIS — E89.0 POSTOPERATIVE HYPOTHYROIDISM: ICD-10-CM

## 2022-05-31 RX ORDER — LEVOTHYROXINE SODIUM 200 MCG
TABLET ORAL
Qty: 90 TABLET | Refills: 0 | Status: SHIPPED | OUTPATIENT
Start: 2022-05-31 | End: 2022-06-08 | Stop reason: SDUPTHER

## 2022-06-07 LAB
T4 FREE SERPL-MCNC: 2 NG/DL (ref 0.8–1.8)
THYROGLOB AB SERPL-ACNC: <0.1 NG/ML
THYROGLOB AB SERPL-ACNC: <1 IU/ML
TSH SERPL-ACNC: 0.08 MIU/L (ref 0.4–4.5)

## 2022-06-08 ENCOUNTER — OFFICE VISIT (OUTPATIENT)
Dept: ENDOCRINOLOGY | Facility: HOSPITAL | Age: 72
End: 2022-06-08
Payer: MEDICARE

## 2022-06-08 VITALS
HEIGHT: 75 IN | BODY MASS INDEX: 33.37 KG/M2 | SYSTOLIC BLOOD PRESSURE: 120 MMHG | WEIGHT: 268.4 LBS | DIASTOLIC BLOOD PRESSURE: 70 MMHG | HEART RATE: 74 BPM

## 2022-06-08 DIAGNOSIS — E89.0 POSTOPERATIVE HYPOTHYROIDISM: ICD-10-CM

## 2022-06-08 DIAGNOSIS — C73 PAPILLARY THYROID CARCINOMA (HCC): Primary | ICD-10-CM

## 2022-06-08 PROCEDURE — 99214 OFFICE O/P EST MOD 30 MIN: CPT | Performed by: INTERNAL MEDICINE

## 2022-06-08 RX ORDER — LEVOTHYROXINE SODIUM 200 MCG
TABLET ORAL
Qty: 90 TABLET | Refills: 3 | Status: SHIPPED | OUTPATIENT
Start: 2022-06-08

## 2022-06-08 RX ORDER — TERBINAFINE HYDROCHLORIDE 250 MG/1
250 TABLET ORAL DAILY
COMMUNITY
Start: 2022-05-05

## 2022-06-08 NOTE — PROGRESS NOTES
6/8/2022    Assessment/Plan      Diagnoses and all orders for this visit:    Papillary thyroid carcinoma ([facility] [address] )  -     TSH, 3rd generation; Future  -     T4, free; Future  -     Thyroglobulin; Future  -     Anti-thyroglobulin antibody; Future  -     TSH, 3rd generation; Future  -     T4, free; Future  -     Thyroglobulin; Future  -     Anti-thyroglobulin antibody; Future    Postoperative hypothyroidism  -     Synthroid 200 MCG tablet; 1 tab 5 days of the week  0 5 tabs Wednesday and 0 5 tab Sunday  BRAND NECESSARY  -     TSH, 3rd generation; Future  -     T4, free; Future  -     Thyroglobulin; Future  -     Anti-thyroglobulin antibody; Future  -     TSH, 3rd generation; Future  -     T4, free; Future  -     Thyroglobulin; Future  -     Anti-thyroglobulin antibody; Future    Other orders  -     terbinafine (LamISIL) 250 mg tablet; Take 250 mg by mouth daily        Assessment/Plan:  1  Thyroid cancer: Thyroglobulin remains undetectable  We will continue to monitor over time and relaxed TSH goal at this time  2  Hypothyroidism:  Decrease Synthroid dose to 1 tablet 5 days of the week and a half tablet the other 2 days of the week and repeat labs in 2-3 months and we will call with the results  Goal TSH 0 1 - 0 5 for now  CC: Follow-up    History of Present Illness     HPI: Praveen Slater is a 73y o  year old male with history of thyroid cancer and postoperative hypothyroidism who presents for a follow-up appointment  In the past, he had an initial surgery for thyroid nodules with pathology showing benign follicular adenoma  He had another nodule in the remaining portion of the thyroid that grew and biopsy was concerning for papillary thyroid cancer  He underwent completion thyroidectomy in 2011 at [facility] with surgical pathology showing a 3 5 cm right lobe papillary thyroid cancer confined to the thyroid without angiolymphatic invasion and surgical margins free of cancer    There was also a follicular adenoma and hyperplastic nodule  The papillary thyroid cancer with classic variant  There was no capsular invasion or lymphovascular invasion noted  Then received 150 mCi of I 131  In July of 2011 at [facility] post I 131 whole-body scan showed residual thyroid tissue without any distant metastasis  For hypothyroidism he continues on Synthroid brand 200 mcg tablets and takes 1 tablet 6 days a week only half tablets on Sundays  Presents today overall feeling well  No symptoms of hyperthyroidism such as tachycardia, palpitations, tremor, shakiness, heat tolerance  Denies neck compressive symptoms  Review of Systems   Constitutional: Negative for fatigue  HENT: Negative for trouble swallowing and voice change  Eyes: Negative for visual disturbance  Respiratory: Negative for shortness of breath  Cardiovascular: Negative for palpitations and leg swelling  Gastrointestinal: Negative for abdominal pain, nausea and vomiting  Endocrine: Negative for polydipsia and polyuria  Musculoskeletal: Negative for arthralgias and myalgias  Skin: Negative for rash  Neurological: Negative for dizziness, tremors and weakness  Hematological: Negative for adenopathy  Psychiatric/Behavioral: Negative for agitation and confusion         Historical Information   Past Medical History:   Diagnosis Date    Hypertension     Prostate enlargement      Past Surgical History:   Procedure Laterality Date    TOTAL THYROIDECTOMY       Social History   Social History     Substance and Sexual Activity   Alcohol Use Yes     Social History     Substance and Sexual Activity   Drug Use No     Social History     Tobacco Use   Smoking Status Former Smoker   Smokeless Tobacco Never Used     Family History:   Family History   Problem Relation Age of Onset    No Known Problems Mother     No Known Problems Father        Meds/Allergies   Current Outpatient Medications   Medication Sig Dispense Refill    amLODIPine (NORVASC) 10 mg tablet Take 10 mg by mouth daily   doxazosin (CARDURA) 4 mg tablet Take 4 mg by mouth daily at bedtime   finasteride (PROSCAR) 5 mg tablet Take 5 mg by mouth daily      rosuvastatin (CRESTOR) 20 MG tablet Take 20 mg by mouth daily      Synthroid 200 MCG tablet 1 tab 5 days of the week  0 5 tabs Wednesday and 0 5 tab Sunday  BRAND NECESSARY  90 tablet 3    terbinafine (LamISIL) 250 mg tablet Take 250 mg by mouth daily      TRELEGY ELLIPTA 100-62 5-25 MCG/INH inhaler   5    gentamicin (GARAMYCIN) 0 3 % ophthalmic solution Administer 1 drop to the right eye every 4 (four) hours (Patient not taking: No sig reported) 5 mL 0     No current facility-administered medications for this visit  No Known Allergies    Objective   Vitals: Blood pressure 120/70, pulse 74, height 6' 3" (1 905 m), weight 122 kg (268 lb 6 4 oz)  Invasive Devices  Report    Drain  Duration           Urethral Catheter Coude 2109 days                Physical Exam  Vitals reviewed  Constitutional:       General: He is not in acute distress  Appearance: He is well-developed  He is not diaphoretic  HENT:      Head: Normocephalic and atraumatic  Eyes:      Conjunctiva/sclera: Conjunctivae normal       Pupils: Pupils are equal, round, and reactive to light  Neck:      Thyroid: No thyromegaly  Cardiovascular:      Rate and Rhythm: Normal rate and regular rhythm  Pulmonary:      Effort: Pulmonary effort is normal  No respiratory distress  Breath sounds: Normal breath sounds  Abdominal:      General: Bowel sounds are normal       Palpations: Abdomen is soft  Musculoskeletal:         General: Normal range of motion  Cervical back: Normal range of motion and neck supple  Skin:     General: Skin is warm and dry  Findings: No rash  Neurological:      Mental Status: He is alert and oriented to person, place, and time  Motor: No abnormal muscle tone     Psychiatric:         Behavior: Behavior normal          The history was obtained from the review of the chart and from the patient  Lab Results:      Recent Results (from the past 14328 hour(s))   Comprehensive metabolic panel    Collection Time: 05/25/21  8:57 AM   Result Value Ref Range    Glucose, Random 95 65 - 99 mg/dL    BUN 19 7 - 25 mg/dL    Creatinine 1 05 0 70 - 1 25 mg/dL    eGFR Non  73 > OR = 60 mL/min/1 73m2    eGFR  84 > OR = 60 mL/min/1 73m2    SL AMB BUN/CREATININE RATIO NOT APPLICABLE 6 - 22 (calc)    Sodium 139 135 - 146 mmol/L    Potassium 4 5 3 5 - 5 3 mmol/L    Chloride 107 98 - 110 mmol/L    CO2 26 20 - 32 mmol/L    Calcium 9 0 8 6 - 10 3 mg/dL    Protein, Total 6 2 6 1 - 8 1 g/dL    Albumin 4 2 3 6 - 5 1 g/dL    Globulin 2 0 1 9 - 3 7 g/dL (calc)    Albumin/Globulin Ratio 2 1 1 0 - 2 5 (calc)    TOTAL BILIRUBIN 0 9 0 2 - 1 2 mg/dL    Alkaline Phosphatase 51 35 - 144 U/L    AST 16 10 - 35 U/L    ALT 14 9 - 46 U/L   T4, free    Collection Time: 05/25/21  8:57 AM   Result Value Ref Range    Free t4 1 9 (H) 0 8 - 1 8 ng/dL   TSH, 3rd generation    Collection Time: 05/25/21  8:57 AM   Result Value Ref Range    TSH 0 10 (L) 0 40 - 4 50 mIU/L   Thyroglobulin Panel    Collection Time: 05/25/21  8:57 AM   Result Value Ref Range    Thyroglobulin Ab <1 < or = 1 IU/mL    Thyroglobulin <0 1 (L) ng/mL   T4, free    Collection Time: 06/06/22  7:13 AM   Result Value Ref Range    Free t4 2 0 (H) 0 8 - 1 8 ng/dL   TSH, 3rd generation    Collection Time: 06/06/22  7:13 AM   Result Value Ref Range    TSH 0 08 (L) 0 40 - 4 50 mIU/L   Thyroglobulin Panel    Collection Time: 06/06/22  7:13 AM   Result Value Ref Range    Thyroglobulin Ab <1 < or = 1 IU/mL    Thyroglobulin <0 1 (L) ng/mL         No future appointments  Portions of the record may have been created with voice recognition software   Occasional wrong word or "sound a like" substitutions may have occurred due to the inherent limitations of voice recognition software  Read the chart carefully and recognize, using context, where substitutions have occurred

## 2022-07-01 ENCOUNTER — TELEPHONE (OUTPATIENT)
Dept: GASTROENTEROLOGY | Facility: CLINIC | Age: 72
End: 2022-07-01

## 2022-08-26 ENCOUNTER — TELEPHONE (OUTPATIENT)
Dept: GASTROENTEROLOGY | Facility: CLINIC | Age: 72
End: 2022-08-26

## 2022-08-26 DIAGNOSIS — Z12.11 SCREENING FOR COLON CANCER: Primary | ICD-10-CM

## 2022-08-26 NOTE — TELEPHONE ENCOUNTER
Scheduled date of colonoscopy (as of today): 9/9/22  Physician performing colonoscopy: Dr Boris Pena  Location of colonoscopy: [facility]  Bowel prep reviewed with patient: BARBARA MORA [facility]  Instructions reviewed with patient by: Mario Jauregui  Clearances: none

## 2022-09-08 ENCOUNTER — ANESTHESIA EVENT (OUTPATIENT)
Dept: ANESTHESIOLOGY | Facility: AMBULATORY SURGERY CENTER | Age: 72
End: 2022-09-08

## 2022-09-08 ENCOUNTER — ANESTHESIA (OUTPATIENT)
Dept: ANESTHESIOLOGY | Facility: AMBULATORY SURGERY CENTER | Age: 72
End: 2022-09-08

## 2022-09-08 NOTE — ANESTHESIA PREPROCEDURE EVALUATION
Procedure:  PRE-OP ONLY    Relevant Problems   ENDO   (+) Postoperative hypothyroidism     Hypertension Prostate enlargement               Anesthesia Plan  ASA Score- 3     Anesthesia Type- IV sedation with anesthesia with ASA Monitors  Additional Monitors:   Airway Plan:           Plan Factors-    Chart reviewed  Patient is not a current smoker  Induction- intravenous  Postoperative Plan-     Informed Consent- Anesthetic plan and risks discussed with patient  I personally reviewed this patient with the CRNA  Discussed and agreed on the Anesthesia Plan with the CRNA  Jacob Aguirre

## 2022-09-09 ENCOUNTER — ANESTHESIA (OUTPATIENT)
Dept: GASTROENTEROLOGY | Facility: AMBULATORY SURGERY CENTER | Age: 72
End: 2022-09-09

## 2022-09-09 ENCOUNTER — ANESTHESIA EVENT (OUTPATIENT)
Dept: GASTROENTEROLOGY | Facility: AMBULATORY SURGERY CENTER | Age: 72
End: 2022-09-09

## 2022-09-09 ENCOUNTER — HOSPITAL ENCOUNTER (OUTPATIENT)
Dept: GASTROENTEROLOGY | Facility: AMBULATORY SURGERY CENTER | Age: 72
Discharge: HOME | End: 2022-09-09
Payer: MEDICARE

## 2022-09-09 VITALS
OXYGEN SATURATION: 97 % | BODY MASS INDEX: 33.37 KG/M2 | DIASTOLIC BLOOD PRESSURE: 72 MMHG | RESPIRATION RATE: 22 BRPM | TEMPERATURE: 97.8 F | SYSTOLIC BLOOD PRESSURE: 132 MMHG | HEART RATE: 78 BPM | HEIGHT: 74 IN | WEIGHT: 260 LBS

## 2022-09-09 DIAGNOSIS — Z86.010 HISTORY OF COLON POLYPS: ICD-10-CM

## 2022-09-09 PROCEDURE — 45380 COLONOSCOPY AND BIOPSY: CPT | Performed by: INTERNAL MEDICINE

## 2022-09-09 PROCEDURE — 88305 TISSUE EXAM BY PATHOLOGIST: CPT | Performed by: PATHOLOGY

## 2022-09-09 RX ORDER — SODIUM CHLORIDE, SODIUM LACTATE, POTASSIUM CHLORIDE, CALCIUM CHLORIDE 600; 310; 30; 20 MG/100ML; MG/100ML; MG/100ML; MG/100ML
50 INJECTION, SOLUTION INTRAVENOUS CONTINUOUS
Status: DISCONTINUED | OUTPATIENT
Start: 2022-09-09 | End: 2022-09-13 | Stop reason: HOSPADM

## 2022-09-09 RX ORDER — PROPOFOL 10 MG/ML
INJECTION, EMULSION INTRAVENOUS AS NEEDED
Status: DISCONTINUED | OUTPATIENT
Start: 2022-09-09 | End: 2022-09-09

## 2022-09-09 RX ADMIN — PROPOFOL 20 MG: 10 INJECTION, EMULSION INTRAVENOUS at 09:04

## 2022-09-09 RX ADMIN — PROPOFOL 20 MG: 10 INJECTION, EMULSION INTRAVENOUS at 09:09

## 2022-09-09 RX ADMIN — SODIUM CHLORIDE, SODIUM LACTATE, POTASSIUM CHLORIDE, CALCIUM CHLORIDE 50 ML/HR: 600; 310; 30; 20 INJECTION, SOLUTION INTRAVENOUS at 08:11

## 2022-09-09 RX ADMIN — PROPOFOL 100 MG: 10 INJECTION, EMULSION INTRAVENOUS at 08:51

## 2022-09-09 RX ADMIN — PROPOFOL 100 MG: 10 INJECTION, EMULSION INTRAVENOUS at 08:53

## 2022-09-09 RX ADMIN — PROPOFOL 30 MG: 10 INJECTION, EMULSION INTRAVENOUS at 09:13

## 2022-09-09 RX ADMIN — PROPOFOL 30 MG: 10 INJECTION, EMULSION INTRAVENOUS at 08:57

## 2022-09-09 NOTE — H&P
History and Physical - [facility]  Josie Franklin 81 y o  male MRN: [id number]    HPI: Josie Franklin is a 81y o  year old male who presents for colonoscopy for history of polyps    REVIEW OF SYSTEMS: Per the HPI, and otherwise unremarkable      Historical Information   Past Medical History:   Diagnosis Date    Colon polyp     COPD (chronic obstructive pulmonary disease) (HCC)     CPAP (continuous positive airway pressure) dependence     Disease of thyroid gland     Hyperlipidemia     Hypertension     Prostate enlargement     Sleep apnea      Past Surgical History:   Procedure Laterality Date    COLONOSCOPY      JOINT REPLACEMENT Left     knee    TOTAL THYROIDECTOMY       Social History   Social History     Substance and Sexual Activity   Alcohol Use Not Currently     Social History     Substance and Sexual Activity   Drug Use No     Social History     Tobacco Use   Smoking Status Former Smoker   Smokeless Tobacco Never Used     Family History   Problem Relation Age of Onset    No Known Problems Mother     No Known Problems Father        Meds/Allergies       Current Outpatient Medications:     amLODIPine (NORVASC) 10 mg tablet    doxazosin (CARDURA) 4 mg tablet    finasteride (PROSCAR) 5 mg tablet    rosuvastatin (CRESTOR) 20 MG tablet    Synthroid 200 MCG tablet    terbinafine (LamISIL) 250 mg tablet    TRELEGY ELLIPTA 100-62 5-25 MCG/INH inhaler    polyethylene glycol (GOLYTELY) 4000 mL solution    Current Facility-Administered Medications:     lactated ringers infusion, 50 mL/hr, Intravenous, Continuous, 50 mL/hr at 09/09/22 0811    No Known Allergies    Objective     /76   Pulse 83   Temp 97 8 °F (36 6 °C) (Temporal)   Resp 16   Ht 6' 2" (1 88 m)   Wt 118 kg (260 lb)   SpO2 95%   BMI 33 38 kg/m²     PHYSICAL EXAM    Gen: NAD AAOx3  Head: Normocephalic, Atraumatic  CV: S1S2 RRR no m/r/g  CHEST: Clear b/l no c/r/w  ABD: soft, +BS NT/ND  EXT: no edema    ASSESSMENT/PLAN:  This is a 81y o  year old male here for colonoscopy, and he is stable and optimized for his procedure

## 2022-09-09 NOTE — ANESTHESIA PREPROCEDURE EVALUATION
Procedure:  COLONOSCOPY    Relevant Problems   ENDO   (+) Postoperative hypothyroidism     Hypertension Prostate enlargement     MIld Nasal Congestion presently  No cough  Covid Vaccinated  Rapid Covid Test - Negative 3 days ago, Chest Clear     Physical Exam    Airway    Mallampati score: II  TM Distance: >3 FB  Neck ROM: full     Dental   No notable dental hx     Cardiovascular      Pulmonary  Breath sounds clear to auscultation,     Other Findings        Anesthesia Plan  ASA Score- 3     Anesthesia Type- IV sedation with anesthesia with ASA Monitors  Additional Monitors:   Airway Plan:           Plan Factors-Exercise tolerance (METS): >4 METS  Chart reviewed  Patient is not a current smoker  Induction- intravenous  Postoperative Plan-     Informed Consent- Anesthetic plan and risks discussed with patient  I personally reviewed this patient with the CRNA  Discussed and agreed on the Anesthesia Plan with the CRNA  Jacob Aguirre
Deteriorating patient status - Patient was clinically upgraded due to deteriorating patient status.

## 2022-09-20 NOTE — RESULT ENCOUNTER NOTE
Polyp benign 3 year recall due to limited right colon preparation  Voicemail will not set up on cellphone  Spoke with wife will relate a information to the patient

## 2023-05-09 LAB
T4 FREE SERPL-MCNC: 1.6 NG/DL (ref 0.8–1.8)
THYROGLOB AB SERPL-ACNC: <0.1 NG/ML
THYROGLOB AB SERPL-ACNC: <1 IU/ML
TSH SERPL-ACNC: 1.3 MIU/L (ref 0.4–4.5)

## 2023-06-05 ENCOUNTER — OFFICE VISIT (OUTPATIENT)
Dept: ENDOCRINOLOGY | Facility: CLINIC | Age: 73
End: 2023-06-05
Payer: MEDICARE

## 2023-06-05 VITALS
WEIGHT: 266.8 LBS | HEART RATE: 76 BPM | HEIGHT: 74 IN | DIASTOLIC BLOOD PRESSURE: 72 MMHG | BODY MASS INDEX: 34.24 KG/M2 | SYSTOLIC BLOOD PRESSURE: 130 MMHG

## 2023-06-05 DIAGNOSIS — E89.0 POSTOPERATIVE HYPOTHYROIDISM: ICD-10-CM

## 2023-06-05 DIAGNOSIS — C73 PAPILLARY THYROID CARCINOMA (HCC): Primary | ICD-10-CM

## 2023-06-05 PROCEDURE — 99214 OFFICE O/P EST MOD 30 MIN: CPT | Performed by: INTERNAL MEDICINE

## 2023-06-05 RX ORDER — LEVOTHYROXINE SODIUM 200 MCG
TABLET ORAL
Qty: 90 TABLET | Refills: 3 | Status: SHIPPED | OUTPATIENT
Start: 2023-06-05

## 2023-06-05 RX ORDER — MULTIVIT WITH MINERALS/LUTEIN
250 TABLET ORAL DAILY
COMMUNITY

## 2023-06-05 NOTE — PROGRESS NOTES
6/5/2023    Assessment/Plan      Diagnoses and all orders for this visit:    Papillary thyroid carcinoma (HCC)  -     Thyroglobulin; Future  -     Anti-thyroglobulin antibody; Future    Postoperative hypothyroidism  -     TSH, 3rd generation; Future  -     T4, free; Future  -     Synthroid 200 MCG tablet; 1 tab 5 days of the week  0 5 tabs Wednesday and 0 5 tab Sunday  BRAND NECESSARY  Other orders  -     ascorbic acid (VITAMIN C) 250 mg tablet; Take 250 mg by mouth daily        Assessment/Plan:  1  Thyroid cancer: Thyroglobulin remains undetectable  Neck exam is negative  We will continue to monitor annually and repeat labs as ordered above prior to next point which be in 1 year  2   Hypothyroidism: Clinically and biochemically doing well  TSH is at a reasonable goal at this point of 0 5-2  Repeat labs prior to next appointment  CC: Follow-up    History of Present Illness     HPI: Brock Dial is a 81y o  year old male with history of thyroid cancer and postoperative hypothyroidism who presents for a follow-up appointment  In the past, he had an initial surgery for thyroid nodules with pathology showing benign follicular adenoma  He underwent another evaluation and another thyroid nodule was found in the remaining portion of the thyroid that grew on biopsy was concerning for papillary thyroid cancer  He underwent completion thyroidectomy in 2011 at [facility] with surgical me showing a 3 5 cm right lobe papillary thyroid cancer confined to the thyroid without angiolymphatic invasion and surgical margins were free of cancer  There was also a follicular adenoma and hyperplastic nodule in the specimen  The papillary thyroid cancer was classic variant  There was no capsular invasion or lymphovascular invasion noted  He then received 150 mCi of I-131  In July 20 [address] [place] [address] a post I-131 whole-body scan showed residual thyroid tissue without any distant metastasis    For hypothyroidism he continues on Synthroid brand 200 mcg tablets and takes 1 tablet 6 days a week and only half tablet on Wednesdays and Sundays  He presents today for follow-up visit overall feeling well  No neck compressive symptoms  Denies any new concerns regarding fatigue, weight changes, palpitations, tachycardia, tremor, shakiness, hair skin or nail changes, bowel habit changes  No neck compressive symptoms  Review of Systems   Constitutional: Negative for fatigue  HENT: Negative for trouble swallowing and voice change  Eyes: Negative for visual disturbance  Respiratory: Negative for shortness of breath  Cardiovascular: Negative for palpitations and leg swelling  Gastrointestinal: Negative for abdominal pain, nausea and vomiting  Endocrine: Negative for polydipsia and polyuria  Musculoskeletal: Negative for arthralgias and myalgias  Skin: Negative for rash  Neurological: Negative for dizziness, tremors and weakness  Hematological: Negative for adenopathy  Psychiatric/Behavioral: Negative for agitation and confusion         Historical Information   Past Medical History:   Diagnosis Date   • Colon polyp    • COPD (chronic obstructive pulmonary disease) (HCC)    • CPAP (continuous positive airway pressure) dependence    • Disease of thyroid gland    • Hyperlipidemia    • Hypertension    • Prostate enlargement    • Sleep apnea      Past Surgical History:   Procedure Laterality Date   • COLONOSCOPY     • JOINT REPLACEMENT Left     knee   • TOTAL THYROIDECTOMY       Social History   Social History     Substance and Sexual Activity   Alcohol Use Not Currently     Social History     Substance and Sexual Activity   Drug Use No     Social History     Tobacco Use   Smoking Status Former   Smokeless Tobacco Never     Family History:   Family History   Problem Relation Age of Onset   • No Known Problems Mother    • No Known Problems Father        Meds/Allergies   Current Outpatient Medications   Medication Sig "Dispense Refill   • amLODIPine (NORVASC) 10 mg tablet Take 10 mg by mouth daily  • ascorbic acid (VITAMIN C) 250 mg tablet Take 250 mg by mouth daily     • doxazosin (CARDURA) 4 mg tablet Take 4 mg by mouth daily at bedtime  • finasteride (PROSCAR) 5 mg tablet Take 5 mg by mouth daily     • rosuvastatin (CRESTOR) 20 MG tablet Take 20 mg by mouth daily     • terbinafine (LamISIL) 250 mg tablet Take 250 mg by mouth daily     • TRELEGY ELLIPTA 100-62 5-25 MCG/INH inhaler   5   • Synthroid 200 MCG tablet 1 tab 5 days of the week  0 5 tabs Wednesday and 0 5 tab Sunday  BRAND NECESSARY  90 tablet 3     No current facility-administered medications for this visit  No Known Allergies    Objective   Vitals: Blood pressure 130/72, pulse 76, height 6' 2\" (1 88 m), weight 121 kg (266 lb 12 8 oz)  Invasive Devices     Drain  Duration           Urethral Catheter Coude 2471 days                Physical Exam  Vitals reviewed  Constitutional:       General: He is not in acute distress  Appearance: He is well-developed  He is not diaphoretic  HENT:      Head: Normocephalic and atraumatic  Eyes:      Conjunctiva/sclera: Conjunctivae normal       Pupils: Pupils are equal, round, and reactive to light  Neck:      Thyroid: No thyromegaly  Cardiovascular:      Rate and Rhythm: Normal rate and regular rhythm  Pulmonary:      Effort: Pulmonary effort is normal  No respiratory distress  Breath sounds: Normal breath sounds  Abdominal:      General: Bowel sounds are normal       Palpations: Abdomen is soft  Musculoskeletal:         General: Normal range of motion  Cervical back: Normal range of motion and neck supple  Skin:     General: Skin is warm and dry  Findings: No rash  Neurological:      Mental Status: He is alert and oriented to person, place, and time  Motor: No abnormal muscle tone     Psychiatric:         Behavior: Behavior normal          The history was obtained from the " "review of the chart and from the patient  Lab Results:      Component      Latest Ref Rng & Units 5/9/2023   THYROGLOBULIN AB      < or = 1 IU/mL <1   THYROGLOBULIN      ng/mL <0 1 (L)   Free T4      0 8 - 1 8 ng/dL 1 6   TSH, POC      0 40 - 4 50 mIU/L 1 30       Future Appointments   Date Time Provider Israel Toro   6/5/2023  7:30 AM Coretta Sheets, DO [facility]       Portions of the record may have been created with voice recognition software  Occasional wrong word or \"sound a like\" substitutions may have occurred due to the inherent limitations of voice recognition software  Read the chart carefully and recognize, using context, where substitutions have occurred      "

## 2024-06-04 LAB
T4 FREE SERPL-MCNC: 1.4 NG/DL (ref 0.8–1.8)
THYROGLOB AB SERPL-ACNC: <0.1 NG/ML
THYROGLOB AB SERPL-ACNC: <1 IU/ML
TSH SERPL-ACNC: 1.67 MIU/L (ref 0.4–4.5)

## 2024-06-12 ENCOUNTER — OFFICE VISIT (OUTPATIENT)
Dept: ENDOCRINOLOGY | Facility: CLINIC | Age: 74
End: 2024-06-12
Payer: MEDICARE

## 2024-06-12 VITALS
HEIGHT: 74 IN | WEIGHT: 271.8 LBS | BODY MASS INDEX: 34.88 KG/M2 | SYSTOLIC BLOOD PRESSURE: 138 MMHG | DIASTOLIC BLOOD PRESSURE: 78 MMHG

## 2024-06-12 DIAGNOSIS — E89.0 POSTOPERATIVE HYPOTHYROIDISM: ICD-10-CM

## 2024-06-12 DIAGNOSIS — C73 PAPILLARY THYROID CARCINOMA (HCC): Primary | ICD-10-CM

## 2024-06-12 PROCEDURE — 99214 OFFICE O/P EST MOD 30 MIN: CPT | Performed by: INTERNAL MEDICINE

## 2024-06-12 RX ORDER — CHOLECALCIFEROL (VITAMIN D3) 1250 MCG
CAPSULE ORAL
COMMUNITY
Start: 2024-04-25

## 2024-06-12 RX ORDER — LEVOTHYROXINE SODIUM 200 MCG
TABLET ORAL
Qty: 90 TABLET | Refills: 3 | Status: SHIPPED | OUTPATIENT
Start: 2024-06-12

## 2024-06-12 NOTE — PROGRESS NOTES
6/12/2024    Assessment & Plan      Diagnoses and all orders for this visit:    Papillary thyroid carcinoma (HCC)  -     TSH, 3rd generation; Future  -     T4, free; Future  -     Thyroglobulin; Future  -     Anti-thyroglobulin antibody; Future    Postoperative hypothyroidism  -     Synthroid 200 MCG tablet; 1 tab 5 days of the week. 0.5 tabs Wednesday and 0.5 tab Sunday. BRAND NECESSARY.  -     TSH, 3rd generation; Future  -     T4, free; Future  -     Thyroglobulin; Future  -     Anti-thyroglobulin antibody; Future    Other orders  -     Cholecalciferol (Vitamin D3) 1.25 MG (04765 UT) CAPS; TAKE ONE CAPSULE every TWO WEEKS from april TO october AND ONE WEEKLY from october TO april        Assessment/Plan:  Assessment & Plan  1. Thyroid cancer.  The patient exhibits no signs of residual or recurrent disease. The thyroglobulin levels remain undetectable and the neck examination is unremarkable. Annual monitoring of the patient's condition will be maintained.    2. Hypothyroidism.  The patient's clinical and biochemical condition are at the desired range. The target TSH level is 0.5-2. The patient will maintain the current regimen.      CC: Follow-up    History of Present Illness     History of Present Illness  The patient is a 72-year-old male with history of thyroid cancer and postoperative hypothyroidism who presents for a follow-up appointment. In the past, he had an initial surgery for thyroid nodules with surgical pathology showing benign follicular adenoma. He underwent another evaluation and another thyroid nodule was found in the remaining portion of the thyroid that grew and on biopsy, it was concerning for papillary thyroid cancer. He underwent completion thyroidectomy in 2011 with surgical pathology showing a 3.5 cm right lobe papillary thyroid cancer confined to the thyroid without angiolymphatic invasion and surgical margins were free of cancer. There was also a follicular adenoma and hyperplastic nodule  in the specimen. The papillary thyroid cancer was classic variant. There was no capsular invasion or lymphovascular invasion noted on pathology. He then received 150 mCi of I-131. In 07/2011 at [facility], the patient had a post I131 whole body scan which showed residual thyroid tissue without any distant metastasis. For hypothyroidism, he continues on Synthroid brand specific 200 mcg tablets and takes 1 tablet 5 days a week and a half tablets on Wednesdays and Sundays.    The patient reports no alterations in his neck condition or dysphagia. He does note some fatigue more so lately.    Review of Systems   Constitutional:  Positive for fatigue.   HENT:  Negative for trouble swallowing and voice change.    Eyes:  Negative for visual disturbance.   Respiratory:  Negative for shortness of breath.    Cardiovascular:  Negative for palpitations and leg swelling.   Gastrointestinal:  Negative for abdominal pain, nausea and vomiting.   Endocrine: Negative for polydipsia and polyuria.   Musculoskeletal:  Negative for arthralgias and myalgias.   Skin:  Negative for rash.   Neurological:  Negative for dizziness, tremors and weakness.   Hematological:  Negative for adenopathy.   Psychiatric/Behavioral:  Negative for agitation and confusion.        Historical Information   Past Medical History:   Diagnosis Date    Colon polyp     COPD (chronic obstructive pulmonary disease) (HCC)     CPAP (continuous positive airway pressure) dependence     Disease of thyroid gland     Hyperlipidemia     Hypertension     Prostate enlargement     Sleep apnea      Past Surgical History:   Procedure Laterality Date    COLONOSCOPY      JOINT REPLACEMENT Left     knee    TOTAL THYROIDECTOMY       Social History   Social History     Substance and Sexual Activity   Alcohol Use Not Currently     Social History     Substance and Sexual Activity   Drug Use No     Social History     Tobacco Use   Smoking Status Former   Smokeless Tobacco Never  "    Family History:   Family History   Problem Relation Age of Onset    No Known Problems Mother     No Known Problems Father        Meds/Allergies   Current Outpatient Medications   Medication Sig Dispense Refill    amLODIPine (NORVASC) 10 mg tablet Take 10 mg by mouth daily.      ascorbic acid (VITAMIN C) 250 mg tablet Take 250 mg by mouth daily      Cholecalciferol (Vitamin D3) 1.25 MG (36132 UT) CAPS TAKE ONE CAPSULE every TWO WEEKS from april TO october AND ONE WEEKLY from october TO april      doxazosin (CARDURA) 4 mg tablet Take 4 mg by mouth daily at bedtime.      finasteride (PROSCAR) 5 mg tablet Take 5 mg by mouth daily      rosuvastatin (CRESTOR) 20 MG tablet Take 20 mg by mouth daily      Synthroid 200 MCG tablet 1 tab 5 days of the week. 0.5 tabs Wednesday and 0.5 tab Sunday. BRAND NECESSARY. 90 tablet 3    TRELEGY ELLIPTA 100-62.5-25 MCG/INH inhaler   5    terbinafine (LamISIL) 250 mg tablet Take 250 mg by mouth daily       No current facility-administered medications for this visit.     No Known Allergies    Objective   Vitals: Blood pressure 138/78, height 6' 2\" (1.88 m), weight 123 kg (271 lb 12.8 oz).  Invasive Devices       Drain  Duration             Urethral Catheter Coude 2844 days                      Physical Exam    Physical Exam:  General: No acute distress. Alert and awake.  HEENT: Normocephalic, atraumatic.  No tissue or nodules palpable in thyroid bed.  No cervical lymphadenopathy.  Cardiac: No lower extremity edema.  Pulmonary: No respiratory distress.  Neuro: Moves all extremities spontaneously.      The history was obtained from the review of the chart and from the patient.    Lab Results:      Recent Results (from the past 42081 hour(s))   T4, free    Collection Time: 05/09/23  7:27 AM   Result Value Ref Range    Free t4 1.6 0.8 - 1.8 ng/dL   TSH, 3rd generation    Collection Time: 05/09/23  7:27 AM   Result Value Ref Range    TSH 1.30 0.40 - 4.50 mIU/L   Thyroglobulin Panel    " "Collection Time: 05/09/23  7:27 AM   Result Value Ref Range    Thyroglobulin Ab <1 < or = 1 IU/mL    Thyroglobulin <0.1 (L) ng/mL   T4, free    Collection Time: 06/04/24  7:06 AM   Result Value Ref Range    Free t4 1.4 0.8 - 1.8 ng/dL   TSH, 3rd generation    Collection Time: 06/04/24  7:06 AM   Result Value Ref Range    TSH 1.67 0.40 - 4.50 mIU/L   Thyroglobulin Panel    Collection Time: 06/04/24  7:06 AM   Result Value Ref Range    Thyroglobulin Ab <1 < or = 1 IU/mL    Thyroglobulin <0.1 (L) ng/mL         No future appointments.      Portions of the record may have been created with voice recognition software. Occasional wrong word or \"sound a like\" substitutions may have occurred due to the inherent limitations of voice recognition software. Read the chart carefully and recognize, using context, where substitutions have occurred.    "

## 2024-07-17 ENCOUNTER — CONSULT (OUTPATIENT)
Dept: PAIN MEDICINE | Facility: CLINIC | Age: 74
End: 2024-07-17
Payer: MEDICARE

## 2024-07-17 ENCOUNTER — APPOINTMENT (OUTPATIENT)
Dept: RADIOLOGY | Facility: CLINIC | Age: 74
End: 2024-07-17
Payer: MEDICARE

## 2024-07-17 VITALS
HEIGHT: 74 IN | TEMPERATURE: 98.6 F | SYSTOLIC BLOOD PRESSURE: 118 MMHG | HEART RATE: 84 BPM | WEIGHT: 269 LBS | DIASTOLIC BLOOD PRESSURE: 72 MMHG | BODY MASS INDEX: 34.52 KG/M2

## 2024-07-17 DIAGNOSIS — M54.16 LUMBAR RADICULOPATHY: ICD-10-CM

## 2024-07-17 DIAGNOSIS — M48.062 SPINAL STENOSIS OF LUMBAR REGION WITH NEUROGENIC CLAUDICATION: Primary | ICD-10-CM

## 2024-07-17 DIAGNOSIS — M48.062 SPINAL STENOSIS OF LUMBAR REGION WITH NEUROGENIC CLAUDICATION: ICD-10-CM

## 2024-07-17 PROBLEM — R39.14 BENIGN PROSTATIC HYPERPLASIA WITH INCOMPLETE BLADDER EMPTYING: Status: ACTIVE | Noted: 2021-05-17

## 2024-07-17 PROBLEM — N40.1 BENIGN PROSTATIC HYPERPLASIA WITH INCOMPLETE BLADDER EMPTYING: Status: ACTIVE | Noted: 2021-05-17

## 2024-07-17 PROCEDURE — G2211 COMPLEX E/M VISIT ADD ON: HCPCS | Performed by: ANESTHESIOLOGY

## 2024-07-17 PROCEDURE — 72110 X-RAY EXAM L-2 SPINE 4/>VWS: CPT

## 2024-07-17 PROCEDURE — 99204 OFFICE O/P NEW MOD 45 MIN: CPT | Performed by: ANESTHESIOLOGY

## 2024-07-17 NOTE — PROGRESS NOTES
Assessment  1. Spinal stenosis of lumbar region with neurogenic claudication    2. Lumbar radiculopathy        Plan      The patient's pain persists despite time, relative rest, activity modification, and nonsteroidal anti-inflammatories. His pain is significantly interfering with his daily living activities. It is appropriate to order an MRI of the lumbar spine to help evaluate any significant etiology of his symptoms.    Patient is to contact our office or present to hospital Emergency Room if experience worsening or new low back/lower extremity pain, sensory or motor change, bladder or bowel dysfunction, or other neurological change.    Once we obtain MRI results, I will follow-up with the patient, review the results and current symptoms, and discuss the next steps of the treatment plan.    My impressions and treatment recommendations were discussed in detail with the patient who verbalized understanding and had no further questions.  Discharge instructions were provided. I personally saw and examined the patient and I agree with the above discussed plan of care.  This note is created using dictation transcription.  It may contain typographical errors, grammatical errors, improperly dictated words, background noise and other errors.    Orders Placed This Encounter   Procedures    MRI lumbar spine without contrast     Standing Status:   Future     Standing Expiration Date:   7/17/2028     Scheduling Instructions:      There is no preparation for this test. Please leave your jewelry and valuables at home, wedding rings are the exception. All patients will be required to change into a hospital gown and pants.  Street clothes are not permitted in the MRI.  Magnetic nail polish must be removed prior to arrival for your test. Please bring your insurance cards, a form of photo ID and a list of your medications with you. Arrive 15 minutes prior to your appointment time in order to register. Please bring any prior CT or  MRI studies of this area that were not performed at a [facility] facility.            To schedule this appointment, please contact Central Scheduling at [phone].            Prior to your appointment, please make sure you complete the MRI Screening Form when you e-Check in for your appointment. This will be available starting 7 days before your appointment in [employer]. You may receive an e-mail with an activation code if you do not have a [employer] account. If you do not have access to a device, we will complete your screening at your appointment.     Order Specific Question:   Reason for Exam     Answer:   lumbar radic     Order Specific Question:   What is the patient's sedation requirement?     Answer:   No Sedation     Order Specific Question:   Does the patient need medication for Claustrophobia? If yes, order medication at this point.     Answer:   No     Order Specific Question:   Does the patient wear a life vest, have an implanted cardiac device, a stimulation device, a sleep apnea stimulator, or a breast tissue expansion device?     Answer:   Unknown     Order Specific Question:   Release to patient through [employer]     Answer:   Immediate    X-ray lumbar spine complete 4+ views     Standing Status:   Future     Standing Expiration Date:   7/17/2028     Scheduling Instructions:      Bring along any outside films relating to this procedure.           No orders of the defined types were placed in this encounter.    Referred By: Self  History of Present Illness    Russ Hill is a 73 y.o. male with low back and lower extremity pain numbness and tingling.  Patient reports longstanding history of back and leg pain.  He saw orthopedic spine surgery in the past and underwent epidural injections and physical therapy and subsequent resolved.  His pain has significant been worsening radiating bilateral low back and lower extremities.  He is unaware of any recent clear precipitant event denies any trauma or injury.   His pain is moderate to severe he rates it as 8 out of 10 the visual analog scale significant impairing with his daily living activities.  His pain is intermittent but worse in the afternoon with a numbness and pins and needle sensation and subjective weakness.  Standing and walking increases symptoms while bending and sitting decreases pain.  Physical therapy and chiropractic treatment provided moderate but short-term relief.  Denies any loss of bowel bladder function.    I have personally reviewed and/or updated the patient's past medical history, past surgical history, family history, social history, current medications, allergies, and vital signs today.     Review of Systems    Patient Active Problem List   Diagnosis    Papillary thyroid carcinoma (HCC)    Postoperative hypothyroidism    Benign prostatic hyperplasia with incomplete bladder emptying       Past Medical History:   Diagnosis Date    Colon polyp     COPD (chronic obstructive pulmonary disease) (HCC)     CPAP (continuous positive airway pressure) dependence     Disease of thyroid gland     Hyperlipidemia     Hypertension     Prostate enlargement     Sleep apnea        Past Surgical History:   Procedure Laterality Date    COLONOSCOPY      JOINT REPLACEMENT Left     knee    TOTAL THYROIDECTOMY         Family History   Problem Relation Age of Onset    No Known Problems Mother     No Known Problems Father        Social History     Occupational History    Not on file   Tobacco Use    Smoking status: Former    Smokeless tobacco: Never   Vaping Use    Vaping status: Never Used   Substance and Sexual Activity    Alcohol use: Not Currently    Drug use: No    Sexual activity: Not on file       Current Outpatient Medications on File Prior to Visit   Medication Sig    amLODIPine (NORVASC) 10 mg tablet Take 10 mg by mouth daily.    ascorbic acid (VITAMIN C) 250 mg tablet Take 250 mg by mouth daily    Cholecalciferol (Vitamin D3) 1.25 MG (18072 UT) CAPS TAKE ONE  "CAPSULE every TWO WEEKS from april TO october AND ONE WEEKLY from october TO april    doxazosin (CARDURA) 4 mg tablet Take 4 mg by mouth daily at bedtime.    finasteride (PROSCAR) 5 mg tablet Take 5 mg by mouth daily    rosuvastatin (CRESTOR) 20 MG tablet Take 20 mg by mouth daily    Synthroid 200 MCG tablet 1 tab 5 days of the week. 0.5 tabs Wednesday and 0.5 tab Sunday. BRAND NECESSARY.    TRELEGY ELLIPTA 100-62.5-25 MCG/INH inhaler     [DISCONTINUED] polyethylene glycol (GOLYTELY) 4000 mL solution Take 4,000 mL by mouth once for 1 dose    [DISCONTINUED] terbinafine (LamISIL) 250 mg tablet Take 250 mg by mouth daily     No current facility-administered medications on file prior to visit.       No Known Allergies    Physical Exam    /72 (BP Location: Left arm, Patient Position: Sitting, Cuff Size: Standard)   Pulse 84   Temp 98.6 °F (37 °C)   Ht 6' 2\" (1.88 m)   Wt 122 kg (269 lb)   BMI 34.54 kg/m²     Constitutional: normal, well developed, well nourished, alert, in no distress and non-toxic and no overt pain behavior. and overweight  Eyes: anicteric  HEENT: grossly intact  Neck: supple, symmetric, trachea midline and no masses   Pulmonary:even and unlabored  Cardiovascular:No edema or pitting edema present  Skin:Normal without rashes or lesions and well hydrated  Psychiatric:Mood and affect appropriate  Neurologic:Cranial Nerves II-XII grossly intact  Musculoskeletal:normal, difficulty going from sitting to standing sitting position; no obvious skin lesions or erythema lumbar sacral spine; mild tenderness in lumbar paravertebrals, no sacroiliac or greater trochanteric tenderness bilateral; deep tendon reflexes are diminished but symmetrical bilateral patellar and achilles; no focal motor deficit appreciated lower limbs; negative bilateral straight leg raising.    Imaging  LUMBAR SPINE @  11-16-16     INDICATION:  Chronic lower back pain radiating to the right side, worse after sitting.   "   COMPARISON: None     VIEWS:  AP, lateral, bilateral oblique and coned down projections; 5 images     FINDINGS:     Minor grade 1 degenerative anterolisthesis of L4 relative to L5.  Mild left convex curvature of lumbar spine, apex at L3.  Alignment is otherwise unremarkable.  No compression deformity is noted.  No destructive osseous lesion is seen.  L5-S1 moderate   disc space with vacuum phenomenon is noted.     Atherosclerotic vascular calcifications are noted.  Visualized soft tissues appear otherwise unremarkable.     IMPRESSION:     No acute osseous abnormality.  Mild lumbar degenerative changes as described    I have personally reviewed pertinent films in PACS and my interpretation is multilevel spondylosis with anterolisthesis.

## 2024-08-04 ENCOUNTER — HOSPITAL ENCOUNTER (OUTPATIENT)
Dept: MRI IMAGING | Facility: HOSPITAL | Age: 74
Discharge: HOME | End: 2024-08-04
Attending: ANESTHESIOLOGY
Payer: MEDICARE

## 2024-08-04 DIAGNOSIS — M54.16 LUMBAR RADICULOPATHY: ICD-10-CM

## 2024-08-04 DIAGNOSIS — M48.062 SPINAL STENOSIS OF LUMBAR REGION WITH NEUROGENIC CLAUDICATION: ICD-10-CM

## 2024-08-04 PROCEDURE — 72148 MRI LUMBAR SPINE W/O DYE: CPT

## 2024-08-06 ENCOUNTER — TELEPHONE (OUTPATIENT)
Dept: PAIN MEDICINE | Facility: CLINIC | Age: 74
End: 2024-08-06

## 2024-08-06 NOTE — TELEPHONE ENCOUNTER
S/w pt, advised of above. Pt verbalized understanding and appreciation. Stated that he has seen Dr. Chawla at the [facility] group in the past, will contact him. Advised pt to cb if there is anything additional.

## 2024-08-06 NOTE — TELEPHONE ENCOUNTER
----- Message from Jeffrey Cordero DO sent at 8/6/2024  1:04 PM EDT -----  MRI to be reviewed by RN only      Deirdre does reveal severe and significant spinal stenosis particularly at L3-4 as well as a lumbar facet cyst.    I would encourage the patient to initially undergo neurosurgical evaluation.  Is he interested.  Any signs or symptoms of cauda equina weakness, bowel or bladder dysfunction etc.

## 2024-10-04 ENCOUNTER — HOSPITAL ENCOUNTER (OUTPATIENT)
Dept: MRI IMAGING | Facility: HOSPITAL | Age: 74
End: 2024-10-04
Payer: MEDICARE

## 2024-10-04 DIAGNOSIS — M48.02 SPINAL STENOSIS, CERVICAL REGION: ICD-10-CM

## 2024-10-04 PROCEDURE — 72141 MRI NECK SPINE W/O DYE: CPT

## 2024-10-18 ENCOUNTER — TELEPHONE (OUTPATIENT)
Age: 74
End: 2024-10-18

## 2024-10-18 NOTE — TELEPHONE ENCOUNTER
S/w pt who is requesting injection for lower back. Pt has numbness when standing or walking of his right leg, hip, buttocks and leg. The numbness of his right leg and hip is new over the last 5-6 months.Pt has semi sharp right buttock pain.  Pt was advised by Dr Chawla at [facility] that he has a bulging L5 disc.   MRI lumbar and cervical spine are in  media.    Pt advised will request records from Dr Chawla for SL to review and will cb to advise of next steps.      Clerical- Please obtain notes from [facility]. Thank you    Pt stated that Dr Chalwa is on vacation next week and will await cb from office after he returns from vacation.

## 2024-10-18 NOTE — TELEPHONE ENCOUNTER
Caller: magan    Doctor: ashlee    Reason for call: pt would like to get a procedure for his low back.    Call back#: [phone]

## 2024-10-21 NOTE — TELEPHONE ENCOUNTER
Patient : Sung Lee Age: 30 year old Sex: male   MRN: [id number] Encounter Date: 7/31/2021      History     Chief Complaint   Patient presents with   • Vomiting     Patient is 30-year-old male with history of diabetes, insulin-dependent, about 5-6 episodes of previous gastroparesis presenting to the emergency department with nausea and vomiting similar to his previous episodes of gastroparesis.  Patient states he has been admitted possibly once in the past for gastroparesis but occasionally comes to the emergency department is able to be discharged home.  Patient states about 2 days ago he began having episodes of nonbloody nonbilious emesis.  About 5-10 episodes per day.  No abdominal pain.  No diarrhea.  No fever or chills.  No chest pain or shortness of breath.  No dysuria, hematuria or frequency.  No leg pain or swelling.  History of previous appendectomy.  Patient does occasionally use marijuana with use approximately 3 days ago and then again earlier today.          No Known Allergies    Current Discharge Medication List      Prior to Admission Medications    Details   LISINOPRIL PO       ondansetron (Zofran ODT) 4 MG disintegrating tablet Place 1 tablet onto the tongue every 6 hours.  Qty: 12 tablet, Refills: 0      acetaminophen (TYLENOL) 325 MG tablet Take 325 mg by mouth every 4 hours as needed for Pain (MILD).      HYDROcodone-acetaminophen (NORCO) 5-325 MG per tablet Take 1 tablet by mouth every 4 hours as needed for Pain (SEVERE).      cyclobenzaprine (FLEXERIL) 5 MG tablet Take 5 mg by mouth every 8 hours as needed for Muscle spasms.      enoxaparin (LOVENOX) 30 MG/0.3ML injectable solution Inject 30 mg into the skin every 12 hours. X42 DAYS      Ergocalciferol (VITAMIN D2 PO) Take 50,000 Units by mouth 1 day a week. X30 DAYS      insulin lispro (HUMALOG CARTRIDGE) 100 UNIT/ML cartridge Inject 120 Units into the skin as directed. PER PT OWN INSULIN PUMP             Past Medical History:   Diagnosis  Received records via fax.  Scanned into chart.   Date   • Diabetes mellitus (CMS/HCC)    • Gastroparesis    • RAD (reactive airway disease)        Past Surgical History:   Procedure Laterality Date   • BONE MARROW TRANSPLANT     • BONY PELVIS SURGERY         No family history on file.    Social History     Tobacco Use   • Smoking status: Not on file   Substance Use Topics   • Alcohol use: Not on file   • Drug use: Not on file       Review of Systems   Constitutional: Negative for activity change and fever.   HENT: Negative for ear pain, nosebleeds, sore throat and trouble swallowing.    Eyes: Negative for pain, discharge and visual disturbance.   Respiratory: Negative for cough, chest tightness, shortness of breath and wheezing.    Cardiovascular: Negative for chest pain and palpitations.   Gastrointestinal: Positive for nausea and vomiting. Negative for abdominal distention, abdominal pain, constipation and diarrhea.   Genitourinary: Negative for difficulty urinating, dysuria, flank pain, hematuria and urgency.   Musculoskeletal: Negative for back pain and neck pain.   Skin: Negative for color change, pallor and rash.   Neurological: Negative for dizziness and headaches.       Physical Exam     ED Triage Vitals [07/31/21 2016]   ED Triage Vitals Group      Temp 98.8 °F (37.1 °C)      Heart Rate (!) 55      Resp 16      BP (!) 146/90      SpO2 100 %      EtCO2 mmHg       Height       Weight 169 lb 12.1 oz (77 kg)      Weight Scale Used       BMI (Calculated)       IBW/kg (Calculated)        Physical Exam  Constitutional:       General: He is in acute distress.      Appearance: Normal appearance.   HENT:      Head: Atraumatic.      Right Ear: Tympanic membrane, ear canal and external ear normal.      Left Ear: Tympanic membrane, ear canal and external ear normal.      Nose: Nose normal.      Mouth/Throat:      Mouth: Mucous membranes are moist.      Pharynx: Oropharynx is clear.   Eyes:      Extraocular Movements: Extraocular movements intact.       Conjunctiva/sclera: Conjunctivae normal.      Pupils: Pupils are equal, round, and reactive to light.   Neck:      Comments: No midline cervical tenderness  Cardiovascular:      Rate and Rhythm: Normal rate and regular rhythm.      Pulses: Normal pulses.      Heart sounds: Normal heart sounds. No murmur heard.     Pulmonary:      Effort: Pulmonary effort is normal. No respiratory distress.      Breath sounds: Normal breath sounds. No wheezing, rhonchi or rales.   Chest:      Chest wall: No tenderness.   Abdominal:      General: Abdomen is flat. Bowel sounds are normal. There is no distension.      Palpations: Abdomen is soft.      Tenderness: There is no abdominal tenderness. There is no guarding.   Musculoskeletal:         General: No tenderness or deformity. Normal range of motion.      Cervical back: Normal range of motion and neck supple.   Skin:     General: Skin is warm and dry.      Capillary Refill: Capillary refill takes less than 2 seconds.      Findings: No rash.   Neurological:      General: No focal deficit present.      Mental Status: He is alert and oriented to person, place, and time.      Sensory: No sensory deficit.      Motor: No weakness.         ED Course     Procedures    Lab Results     Results for orders placed or performed during the hospital encounter of 07/31/21   Comprehensive Metabolic Panel   Result Value Ref Range    Fasting Status      Sodium 140 135 - 145 mmol/L    Potassium 3.9 3.4 - 5.1 mmol/L    Chloride 103 98 - 107 mmol/L    Carbon Dioxide 23 21 - 32 mmol/L    Anion Gap 18 10 - 20 mmol/L    Glucose 230 (H) 65 - 99 mg/dL    BUN 13 6 - 20 mg/dL    Creatinine 0.81 0.67 - 1.17 mg/dL    Glomerular Filtration Rate >90 >90 mL/min/1.73m2    BUN/ Creatinine Ratio 16 7 - 25    Calcium 9.1 8.4 - 10.2 mg/dL    Bilirubin, Total 1.0 0.2 - 1.0 mg/dL    GOT/AST 15 <=37 Units/L    GPT/ALT 24 <64 Units/L    Alkaline Phosphatase 103 45 - 117 Units/L    Albumin 4.4 3.6 - 5.1 g/dL    Protein, Total  7.9 6.4 - 8.2 g/dL    Globulin 3.5 2.0 - 4.0 g/dL    A/G Ratio 1.3 1.0 - 2.4   Lipase   Result Value Ref Range    Lipase 67 (L) 73 - 393 Units/L   CBC with Automated Differential (performable only)   Result Value Ref Range    WBC 13.9 (H) 4.2 - 11.0 K/mcL    RBC 5.10 4.50 - 5.90 mil/mcL    HGB 14.8 13.0 - 17.0 g/dL    HCT 44.2 39.0 - 51.0 %    MCV 86.7 78.0 - 100.0 fl    MCH 29.0 26.0 - 34.0 pg    MCHC 33.5 32.0 - 36.5 g/dL    RDW-CV 12.9 11.0 - 15.0 %    RDW-SD 40.5 39.0 - 50.0 fL     140 - 450 K/mcL    NRBC 0 <=0 /100 WBC    Neutrophil, Percent 90 %    Lymphocytes, Percent 7 %    Mono, Percent 2 %    Eosinophils, Percent 0 %    Basophils, Percent 0 %    Immature Granulocytes 1 %    Absolute Neutrophils 12.4 (H) 1.8 - 7.7 K/mcL    Absolute Lymphocytes 1.0 1.0 - 4.8 K/mcL    Absolute Monocytes 0.3 0.3 - 0.9 K/mcL    Absolute Eosinophils  0.0 0.0 - 0.5 K/mcL    Absolute Basophils 0.0 0.0 - 0.3 K/mcL    Absolute Immmature Granulocytes 0.1 0.0 - 0.2 K/mcL       Radiology Results     Imaging Results    None         ED Medication Orders (From admission, onward)    Ordered Start     Status Ordering Provider    07/31/21 2110 07/31/21 2215  capsaicin (ZOSTRIX) 0.025 % cream  ONCE      Last MAR action: Given VANCE SOTELO    07/31/21 2024 07/31/21 2025  ondansetron (ZOFRAN) injection 4 mg  (ED Adult Abdominal Pain, V/D, GI Bleeding)  ONCE      Last MAR action: Given ROMULO ESCAMILLA          ED Course as of Jul 31 2259   Sat Jul 31, 2021 2123 CBC and CMP grossly benign with mild hyperglycemia 230 with mild leukocytosis 13.9 likely reactive secondary to dehydration and vomiting status, lipase 67 low concern for pancreatitis.    [KG]   2257 Upon reevaluation patient able to tolerate p.o. at this time feels improved at this time.  Will discharge home and follow-up with PCP in 1 to 2 days, given return precautions worsening abdominal pain nausea vomiting fevers or chills which he verbalized understanding.  Please  suffering from gastroparesis versus cannabinoid hyperemesis.    [KG]      ED Course User Index  [KG] Prakash Patel,        MDM  Number of Diagnoses or Management Options  Vomiting, intractability of vomiting not specified, presence of nausea not specified, unspecified vomiting type  Diagnosis management comments: 30-year-old male presented to the emergency department with multiple episodes on injury and vomiting over the past 48 hours, unable to tolerate p.o., history of diabetes as well as gastroparesis.  Labs are sent off, patient to be treated symptomatically, reexamine and disposition.    1028 patient feeling improved, again no abdominal pain or tenderness examination.  IV fluids completed, patient requesting p.o. challenge which is provided.  Plan is to reexamine and disposition.    1059 patient feeling much improved, no abdominal pain or tenderness on examination, tolerating p.o. without difficulty, requesting discharge home.  Discussed with patient return precautions, need to follow-up for additional evaluation and clearly able to demonstrate understanding.  Plan to discharge with follow-up.    I participated in the following activities of this patients care: The medical history, the physical exam, medical decision making.  I personally performed: Supervision of the patient's care, the medical history, the physical exam, the medical decision making.  The case was discussed with: The resident  Evaluation and management service: I agree with the evaluation and management decisions made in this patient's care.  Results interpretation: I agree with the study interpretation in this patient's care, I agree with the documentation of the study interpretation    Clinical Impression     ED Diagnosis   1. Vomiting, intractability of vomiting not specified, presence of nausea not specified, unspecified vomiting type         Disposition        There is no disposition no dispo time  There is no comment                      Devon Oglesby MD  07/31/21 3277

## 2024-10-23 NOTE — TELEPHONE ENCOUNTER
PRE OP INSTRUCTIONS:   -If you are on prescription blood thinners, you may have to hold the medication for several days before the procedure.    Please call the office to discuss medication holds at [phone].  -Do not eat or drink ONE HOUR prior to your procedure. If you are diabetic, may follow regular breakfast/lunch schedule and take usual    diabetic medications.  -Lumbar( low back) procedure, please wear comfortable slacks/pants.  -Cervical (neck) procedure, please wear a shirt/blouse that is easy to remove.  -A  is required to take you home form your procedure.  -Continue all to take prescribed medication the day of your procedure, including blood pressure medications.  -If you are prescribe antibiotics, have an active infection or have an open wound, please contact the office at [phone].  -Please refrain from any vaccinations two weeks before and two weeks after injection.  -Insurance authorization received in not a guarantee of payment per your insurance company's authorization disclaimer and it is    your responsibility to verify your benefits.          -If you have any questions about the instructions, please call me at [phone]          -PATIENT INSTRUCTED TO STOP ALL NSAID'S 4 DAYS PRIOR TO PROCEDURE            EXCEPT FOR IBUPROFEN IT CAN BE TAKEN UP TO 24 HOURS PRIOR TO PROCEDURE.

## 2024-11-04 ENCOUNTER — HOSPITAL ENCOUNTER (OUTPATIENT)
Dept: RADIOLOGY | Facility: CLINIC | Age: 74
Discharge: HOME | End: 2024-11-04
Admitting: ANESTHESIOLOGY
Payer: MEDICARE

## 2024-11-04 VITALS
RESPIRATION RATE: 20 BRPM | HEART RATE: 73 BPM | TEMPERATURE: 97.7 F | SYSTOLIC BLOOD PRESSURE: 136 MMHG | OXYGEN SATURATION: 93 % | DIASTOLIC BLOOD PRESSURE: 81 MMHG

## 2024-11-04 DIAGNOSIS — M54.16 LUMBAR RADICULOPATHY: ICD-10-CM

## 2024-11-04 PROCEDURE — 64484 NJX AA&/STRD TFRM EPI L/S EA: CPT | Performed by: ANESTHESIOLOGY

## 2024-11-04 PROCEDURE — 64483 NJX AA&/STRD TFRM EPI L/S 1: CPT | Performed by: ANESTHESIOLOGY

## 2024-11-04 RX ORDER — METHYLPREDNISOLONE ACETATE 80 MG/ML
160 INJECTION, SUSPENSION INTRA-ARTICULAR; INTRALESIONAL; INTRAMUSCULAR; PARENTERAL; SOFT TISSUE ONCE
Status: COMPLETED | OUTPATIENT
Start: 2024-11-04 | End: 2024-11-04

## 2024-11-04 RX ADMIN — IOHEXOL 2 ML: 300 INJECTION, SOLUTION INTRAVENOUS at 14:13

## 2024-11-04 RX ADMIN — LIDOCAINE HYDROCHLORIDE 1.75 ML: 20 INJECTION, SOLUTION EPIDURAL; INFILTRATION; INTRACAUDAL at 14:13

## 2024-11-04 RX ADMIN — METHYLPREDNISOLONE ACETATE 160 MG: 80 INJECTION, SUSPENSION INTRA-ARTICULAR; INTRALESIONAL; INTRAMUSCULAR; SOFT TISSUE at 14:13

## 2024-11-04 NOTE — DISCHARGE INSTRUCTIONS
Epidural Steroid Injection   WHAT YOU NEED TO KNOW:   An epidural steroid injection (INNA) is a procedure to inject steroid medicine into the epidural space. The epidural space is between your spinal cord and vertebrae. Steroids reduce inflammation and fluid buildup in your spine that may be causing pain. You may be given pain medicine along with the steroids.          ACTIVITY  Do not drive or operate machinery today.  No strenuous activity today - bending, lifting, etc.  You may resume normal activites starting tomorrow - start slowly and as tolerated.  You may shower today, but no tub baths or hot tubs.  You may have numbness for several hours from the local anesthetic. Please use caution and common sense, especially with weight-bearing activities.    CARE OF THE INJECTION SITE  If you have soreness or pain, apply ice to the area today (20 minutes on/20 minutes off).  Starting tomorrow, you may use warm, moist heat or ice if needed.  You may have an increase or change in your discomfort for 36-48 hours after your treatment.  Apply ice and continue with any pain medication you have been prescribed.  Notify the [facility] if you have any of the following: redness, drainage, swelling, headache, stiff neck or fever above 100°F.    SPECIAL INSTRUCTIONS  Our office will contact you in approximately 14 days for a progress report.    MEDICATIONS  Continue to take all routine medications.  Our office may have instructed you to hold some medications.    As no general anesthesia was used in today's procedure, you should not experience any side effects related to anesthesia.     If you are diabetic, the steroids used in today's injection may temporarily increase your blood sugar levels after the first few days after your injection. Please keep a close eye on your sugars and alert the doctor who manages your diabetes if your sugars are significantly high from your baseline or you are symptomatic.     If you have a  problem specifically related to your procedure, please call our office at [phone].  Problems not related to your procedure should be directed to your primary care physician.

## 2024-11-04 NOTE — H&P
History of Present Illness: The patient is a 74 y.o. male who presents with complaints of low back and leg pain.    Past Medical History:   Diagnosis Date    Colon polyp     COPD (chronic obstructive pulmonary disease) (HCC)     CPAP (continuous positive airway pressure) dependence     Disease of thyroid gland     Hyperlipidemia     Hypertension     Prostate enlargement     Sleep apnea        Past Surgical History:   Procedure Laterality Date    COLONOSCOPY      JOINT REPLACEMENT Left     knee    TOTAL THYROIDECTOMY           Current Outpatient Medications:     amLODIPine (NORVASC) 10 mg tablet, Take 10 mg by mouth daily., Disp: , Rfl:     ascorbic acid (VITAMIN C) 250 mg tablet, Take 250 mg by mouth daily, Disp: , Rfl:     Cholecalciferol (Vitamin D3) 1.25 MG (42639 UT) CAPS, TAKE ONE CAPSULE every TWO WEEKS from april TO october AND ONE WEEKLY from october TO april, Disp: , Rfl:     doxazosin (CARDURA) 4 mg tablet, Take 4 mg by mouth daily at bedtime., Disp: , Rfl:     finasteride (PROSCAR) 5 mg tablet, Take 5 mg by mouth daily, Disp: , Rfl:     rosuvastatin (CRESTOR) 20 MG tablet, Take 20 mg by mouth daily, Disp: , Rfl:     Synthroid 200 MCG tablet, 1 tab 5 days of the week. 0.5 tabs Wednesday and 0.5 tab Sunday. BRAND NECESSARY., Disp: 90 tablet, Rfl: 3    TRELEGY ELLIPTA 100-62.5-25 MCG/INH inhaler, , Disp: , Rfl: 5    Current Facility-Administered Medications:     iohexol (OMNIPAQUE) 300 mg/mL injection 2 mL, 2 mL, Epidural, Once, Jeffrey Cordero DO    lidocaine (PF) (XYLOCAINE-MPF) 2 % injection 1.75 mL, 1.75 mL, Epidural, Once, Jeffrey Cordero DO    methylPREDNISolone acetate (DEPO-MEDROL) injection 160 mg, 160 mg, Epidural, Once, Jeffrey Cordero DO    No Known Allergies    Physical Exam:   General: Awake, Alert, Oriented x 3, Mood and affect appropriate  Respiratory: Respirations even and unlabored  Cardiovascular: Peripheral pulses intact; no edema  Musculoskeletal Exam: Normal gait    ASA Score: III          Assessment:   1. Lumbar radiculopathy        Plan: right L5 and right S1 TFESI with 2% lidocaine and pain diary as per Dr. Chawla [phone]

## 2024-11-18 ENCOUNTER — TELEPHONE (OUTPATIENT)
Dept: PAIN MEDICINE | Facility: CLINIC | Age: 74
End: 2024-11-18

## 2024-11-20 ENCOUNTER — OFFICE VISIT (OUTPATIENT)
Dept: PAIN MEDICINE | Facility: CLINIC | Age: 74
End: 2024-11-20
Payer: MEDICARE

## 2024-11-20 VITALS
SYSTOLIC BLOOD PRESSURE: 138 MMHG | WEIGHT: 265 LBS | BODY MASS INDEX: 34.01 KG/M2 | HEART RATE: 83 BPM | TEMPERATURE: 98.3 F | HEIGHT: 74 IN | DIASTOLIC BLOOD PRESSURE: 80 MMHG

## 2024-11-20 DIAGNOSIS — M54.12 CERVICAL RADICULOPATHY: Primary | ICD-10-CM

## 2024-11-20 DIAGNOSIS — M48.02 FORAMINAL STENOSIS OF CERVICAL REGION: ICD-10-CM

## 2024-11-20 DIAGNOSIS — M48.02 CERVICAL SPINAL STENOSIS: ICD-10-CM

## 2024-11-20 PROCEDURE — 99214 OFFICE O/P EST MOD 30 MIN: CPT | Performed by: PHYSICIAN ASSISTANT

## 2024-11-20 PROCEDURE — G2211 COMPLEX E/M VISIT ADD ON: HCPCS | Performed by: PHYSICIAN ASSISTANT

## 2024-11-20 NOTE — PROGRESS NOTES
Assessment:  1. Cervical radiculopathy    2. Cervical spinal stenosis    3. Foraminal stenosis of cervical region        Plan:  While the patient was in the office today, I did have a thorough conversation regarding their chronic pain syndrome, medication management, and treatment plan options.    After discussing options, the patient will be scheduled for a C7-T1 interlaminar epidural steroid injection to address the radicular component of his pain pattern.  Patient will then follow-up with Dr. Chawla afterwards if no response.    His lumbar radicular symptoms have improved by 100% following the right L5 and S1 transforaminal epidural steroid injection.    Complete risks and benefits including bleeding, infection, tissue reaction, nerve injury and allergic reaction were discussed. The approach was demonstrated using models and literature was provided. Verbal and written consent was obtained.    The patient was advised to contact the office should their symptoms worsen in the interim. The patient was agreeable and verbalized an understanding.        History of Present Illness:    The patient is a 74 y.o. male last seen on 11/4/2024 who presents for a follow up office visit in regards to chronic radicular neck pain secondary to cervical spondylosis, cervical foraminal and central canal stenosis as well as chronic radicular low back pain secondary to lumbar degenerative disc disease.  The patient currently reports neck pain with right upper extremity radicular symptoms.  He rates his pain a 2 out of 10 describes it as an intermittent sharp and shooting pain in the neck with numbness and paresthesias in the right hand.  Patient does not necessarily perceive any weakness.  He underwent the cervical spine MRI and follow-up with Dr. Chawla and has been referred here to discuss injection therapy.  Patient is status post right L5 and S1 transforaminal epidural steroid injection on 11/4/2024 with complete resolution of pain in  that region.    I have personally reviewed and/or updated the patient's past medical history, past surgical history, family history, social history, current medications, allergies, and vital signs today.       Review of Systems:    Review of Systems   Respiratory:  Negative for shortness of breath.    Cardiovascular:  Negative for chest pain.   Gastrointestinal:  Negative for constipation, diarrhea, nausea and vomiting.   Musculoskeletal:  Positive for gait problem. Negative for arthralgias, joint swelling and myalgias.   Skin:  Negative for rash.   Neurological:  Positive for weakness. Negative for dizziness and seizures.   All other systems reviewed and are negative.        Past Medical History:   Diagnosis Date    Colon polyp     COPD (chronic obstructive pulmonary disease) ([facility])     CPAP (continuous positive airway pressure) dependence     Disease of thyroid gland     Hyperlipidemia     Hypertension     Prostate enlargement     Sleep apnea        Past Surgical History:   Procedure Laterality Date    COLONOSCOPY      JOINT REPLACEMENT Left     knee    TOTAL THYROIDECTOMY         Family History   Problem Relation Age of Onset    No Known Problems Mother     No Known Problems Father        Social History     Occupational History    Not on file   Tobacco Use    Smoking status: Former    Smokeless tobacco: Never   Vaping Use    Vaping status: Never Used   Substance and Sexual Activity    Alcohol use: Not Currently    Drug use: No    Sexual activity: Not on file         Current Outpatient Medications:     amLODIPine (NORVASC) 10 mg tablet, Take 10 mg by mouth daily., Disp: , Rfl:     ascorbic acid (VITAMIN C) 250 mg tablet, Take 250 mg by mouth daily, Disp: , Rfl:     Cholecalciferol (Vitamin D3) 1.25 MG (15336 UT) CAPS, TAKE ONE CAPSULE every TWO WEEKS from april TO october AND ONE WEEKLY from october TO april, Disp: , Rfl:     doxazosin (CARDURA) 4 mg tablet, Take 4 mg by mouth daily at bedtime., Disp: , Rfl:      "finasteride (PROSCAR) 5 mg tablet, Take 5 mg by mouth daily, Disp: , Rfl:     rosuvastatin (CRESTOR) 20 MG tablet, Take 20 mg by mouth daily, Disp: , Rfl:     Synthroid 200 MCG tablet, 1 tab 5 days of the week. 0.5 tabs Wednesday and 0.5 tab Sunday. BRAND NECESSARY., Disp: 90 tablet, Rfl: 3    TRELEGY ELLIPTA 100-62.5-25 MCG/INH inhaler, , Disp: , Rfl: 5    No Known Allergies    Physical Exam:    /80 (BP Location: Left arm, Patient Position: Sitting, Cuff Size: Large)   Pulse 83   Temp 98.3 °F (36.8 °C)   Ht 6' 2\" (1.88 m)   Wt 120 kg (265 lb)   BMI 34.02 kg/m²     Constitutional:normal, well developed, well nourished, alert, in no distress and non-toxic and no overt pain behavior.  Eyes:anicteric  HEENT:grossly intact  Neck:supple, symmetric, trachea midline and no masses   Pulmonary:even and unlabored  Cardiovascular:No edema or pitting edema present  Skin:Normal without rashes or lesions and well hydrated  Psychiatric:Mood and affect appropriate  Neurologic:Cranial Nerves II-XII grossly intact  Musculoskeletal: Positive Spurling's to the right      Imaging    MRI CERVICAL SPINE WITHOUT CONTRAST 10/4/2024 @ [facility]     INDICATION: M48.02: Spinal stenosis, cervical region.     COMPARISON:  None.     TECHNIQUE:  Multiplanar, multisequence imaging of the cervical spine was performed. .        IMAGE QUALITY:  Diagnostic     FINDINGS:     ALIGNMENT: Mild straightening of the lumbar spine alignment. Mild retrolisthesis of C3 on C4 with mild anterolisthesis of C4 on C5.     No acute fracture.     MARROW SIGNAL: Endplate centered marrow changes, including mild marrow edema involving C3-C4.     CERVICAL AND VISUALIZED THORACIC CORD: No cord signal abnormality which can be confirmed in 2 planes.     PREVERTEBRAL AND PARASPINAL SOFT TISSUES: No acute abnormality.     VISUALIZED POSTERIOR FOSSA: No acute abnormality, noting this examination is not tailored for assessment.     CERVICAL DISC SPACES: Mild multilevel " degenerative disc disease.     C2-C3: Disc osteophyte complex. Moderate left with mild right neuroforaminal narrowing. No significant spinal canal stenosis.     C3-C4: Disc osteophyte complex. Marked bilateral neuroforaminal narrowing. Moderate-marked central spinal canal stenosis.     C4-C5: Disc osteophyte complex. Moderate left with mild right neuroforaminal narrowing. Mild-moderate central spinal canal stenosis.     C5-C6: Disc osteophyte complex. Marked right with moderate left neuroforaminal narrowing. Mild-moderate central spinal canal stenosis.     C6-C7: Disc osteophyte complex. Marked bilateral neuroforaminal narrowing. Mild-moderate central spinal canal stenosis.     C7-T1: Disc osteophyte complex. Moderate-marked bilateral neuroforaminal narrowing. Mild spinal canal stenosis.     UPPER THORACIC DISC SPACES: Spondylotic changes without acute critical central canal stenosis.     IMPRESSION:     Multilevel spondylotic changes of the cervical spine, including up to multilevel marked neuroforaminal narrowing and moderate-marked central spinal canal stenosis at C3-C4. See narrative above for full details.     The study was marked in EPIC for significant notification.

## 2024-12-04 ENCOUNTER — HOSPITAL ENCOUNTER (OUTPATIENT)
Dept: RADIOLOGY | Facility: CLINIC | Age: 74
Discharge: HOME | End: 2024-12-04
Admitting: ANESTHESIOLOGY
Payer: MEDICARE

## 2024-12-04 VITALS
HEART RATE: 88 BPM | OXYGEN SATURATION: 94 % | SYSTOLIC BLOOD PRESSURE: 167 MMHG | RESPIRATION RATE: 16 BRPM | DIASTOLIC BLOOD PRESSURE: 88 MMHG | TEMPERATURE: 97 F

## 2024-12-04 DIAGNOSIS — M54.12 CERVICAL RADICULOPATHY: ICD-10-CM

## 2024-12-04 PROCEDURE — 62321 NJX INTERLAMINAR CRV/THRC: CPT | Performed by: ANESTHESIOLOGY

## 2024-12-04 RX ORDER — METHYLPREDNISOLONE ACETATE 80 MG/ML
80 INJECTION, SUSPENSION INTRA-ARTICULAR; INTRALESIONAL; INTRAMUSCULAR; PARENTERAL; SOFT TISSUE ONCE
Status: COMPLETED | OUTPATIENT
Start: 2024-12-04 | End: 2024-12-04

## 2024-12-04 RX ADMIN — METHYLPREDNISOLONE ACETATE 80 MG: 80 INJECTION, SUSPENSION INTRA-ARTICULAR; INTRALESIONAL; INTRAMUSCULAR; SOFT TISSUE at 11:24

## 2024-12-04 RX ADMIN — IOHEXOL 1 ML: 300 INJECTION, SOLUTION INTRAVENOUS at 11:24

## 2024-12-04 NOTE — DISCHARGE INSTRUCTIONS
Epidural Steroid Injection   WHAT YOU NEED TO KNOW:   An epidural steroid injection (INNA) is a procedure to inject steroid medicine into the epidural space. The epidural space is between your spinal cord and vertebrae. Steroids reduce inflammation and fluid buildup in your spine that may be causing pain. You may be given pain medicine along with the steroids.          ACTIVITY  Do not drive or operate machinery today.  No strenuous activity today - bending, lifting, etc.  You may resume normal activites starting tomorrow - start slowly and as tolerated.  You may shower today, but no tub baths or hot tubs.  You may have numbness for several hours from the local anesthetic. Please use caution and common sense, especially with weight-bearing activities.    CARE OF THE INJECTION SITE  If you have soreness or pain, apply ice to the area today (20 minutes on/20 minutes off).  Starting tomorrow, you may use warm, moist heat or ice if needed.  You may have an increase or change in your discomfort for 36-48 hours after your treatment.  Apply ice and continue with any pain medication you have been prescribed.  Notify the [facility] if you have any of the following: redness, drainage, swelling, headache, stiff neck or fever above 100°F.    SPECIAL INSTRUCTIONS  Our office will contact you in approximately 14 days for a progress report.    MEDICATIONS  Continue to take all routine medications.  Our office may have instructed you to hold some medications.    As no general anesthesia was used in today's procedure, you should not experience any side effects related to anesthesia.     If you are diabetic, the steroids used in today's injection may temporarily increase your blood sugar levels after the first few days after your injection. Please keep a close eye on your sugars and alert the doctor who manages your diabetes if your sugars are significantly high from your baseline or you are symptomatic.     If you have a  problem specifically related to your procedure, please call our office at [phone].  Problems not related to your procedure should be directed to your primary care physician.

## 2024-12-04 NOTE — H&P
History of Present Illness: The patient is a 74 y.o. male who presents with complaints of neck and arm pain.    Past Medical History:   Diagnosis Date    Colon polyp     COPD (chronic obstructive pulmonary disease) (HCC)     CPAP (continuous positive airway pressure) dependence     Disease of thyroid gland     Hyperlipidemia     Hypertension     Prostate enlargement     Sleep apnea        Past Surgical History:   Procedure Laterality Date    COLONOSCOPY      JOINT REPLACEMENT Left     knee    TOTAL THYROIDECTOMY           Current Outpatient Medications:     amLODIPine (NORVASC) 10 mg tablet, Take 10 mg by mouth daily., Disp: , Rfl:     ascorbic acid (VITAMIN C) 250 mg tablet, Take 250 mg by mouth daily, Disp: , Rfl:     Cholecalciferol (Vitamin D3) 1.25 MG (65801 UT) CAPS, TAKE ONE CAPSULE every TWO WEEKS from april TO october AND ONE WEEKLY from october TO april, Disp: , Rfl:     doxazosin (CARDURA) 4 mg tablet, Take 4 mg by mouth daily at bedtime., Disp: , Rfl:     finasteride (PROSCAR) 5 mg tablet, Take 5 mg by mouth daily, Disp: , Rfl:     rosuvastatin (CRESTOR) 20 MG tablet, Take 20 mg by mouth daily, Disp: , Rfl:     Synthroid 200 MCG tablet, 1 tab 5 days of the week. 0.5 tabs Wednesday and 0.5 tab Sunday. BRAND NECESSARY., Disp: 90 tablet, Rfl: 3    TRELEGY ELLIPTA 100-62.5-25 MCG/INH inhaler, , Disp: , Rfl: 5    Current Facility-Administered Medications:     iohexol (OMNIPAQUE) 300 mg/mL injection 1 mL, 1 mL, Epidural, Once, Jeffrey Cordero DO    methylPREDNISolone acetate (DEPO-MEDROL) injection 80 mg, 80 mg, Epidural, Once, Jeffrey Cordero DO    No Known Allergies    Physical Exam:   General: Awake, Alert, Oriented x 3, Mood and affect appropriate  Respiratory: Respirations even and unlabored  Cardiovascular: Peripheral pulses intact; no edema  Musculoskeletal Exam: Decreased range of motion cervical spine    ASA Score: II    Patient/Chart Verification  Patient ID Verified: Verbal  ID Band Applied: No  Consents  Confirmed: Procedural, To be obtained in the Pre-Procedure area  Interval H&P(within 24 hr) Complete (required for Outpatients and Surgery Admit only): To be obtained in the Procedural area  Allergies Reviewed: Yes  Anticoag/NSAID held?: NA  Currently on antibiotics?: No    Assessment:   1. Cervical radiculopathy        Plan: C7-T1 ALISHA

## 2024-12-18 ENCOUNTER — TELEPHONE (OUTPATIENT)
Dept: PAIN MEDICINE | Facility: CLINIC | Age: 74
End: 2024-12-18

## 2024-12-18 DIAGNOSIS — R20.2 NUMBNESS AND TINGLING IN RIGHT HAND: Primary | ICD-10-CM

## 2024-12-18 DIAGNOSIS — R20.0 NUMBNESS AND TINGLING IN RIGHT HAND: Primary | ICD-10-CM

## 2024-12-18 NOTE — TELEPHONE ENCOUNTER
"S/w pt, stated that he had an emg of his R arm \"many many many\" years ago. Pt stated that he has never had an ultrasound of his arms / hands before. Advised pt of SL's comments. Pt verbalized understanding and agreement. Advised pt, the writer will make SL aware and cb to advise of the next steps.   "

## 2024-12-18 NOTE — TELEPHONE ENCOUNTER
Attempted to contact pt, left a detailed message on machine per medical communication consent on file advising of above. Provided cb number and office hours.

## 2024-12-18 NOTE — TELEPHONE ENCOUNTER
Has the patient ever had an EMG or ultrasound.  I will consider ultrasound of the bilateral hands to rule out any peripheral etiology of his symptoms and if he is interested I will place order in epic.

## 2024-12-18 NOTE — TELEPHONE ENCOUNTER
Can you please specify the patient where exactly is the numbness?  It will help us lead to next step of treatment plan.  Does he have any pain or is it just again the numbness, confirming.

## 2025-01-29 ENCOUNTER — HOSPITAL ENCOUNTER (OUTPATIENT)
Dept: ULTRASOUND IMAGING | Facility: HOSPITAL | Age: 75
Discharge: HOME | End: 2025-01-29
Attending: ANESTHESIOLOGY
Payer: MEDICARE

## 2025-01-29 DIAGNOSIS — R20.0 NUMBNESS AND TINGLING IN RIGHT HAND: ICD-10-CM

## 2025-01-29 DIAGNOSIS — R20.2 NUMBNESS AND TINGLING IN RIGHT HAND: ICD-10-CM

## 2025-01-29 PROCEDURE — 76882 US LMTD JT/FCL EVL NVASC XTR: CPT

## 2025-02-03 ENCOUNTER — TELEPHONE (OUTPATIENT)
Dept: PAIN MEDICINE | Facility: CLINIC | Age: 75
End: 2025-02-03

## 2025-02-03 NOTE — TELEPHONE ENCOUNTER
----- Message from Jeffrey Cordero DO sent at 2/2/2025 12:45 PM EST -----  US results: Findings suspicious for carpal tunnel syndrome based on presence of hypervascularity within the median nerve    Recommend ortho eval-

## 2025-02-05 NOTE — TELEPHONE ENCOUNTER
Caller: pt    Doctor: Dr. rosado    Reason for call: I read pt the results and he would like the ortho eval.    Call back#: [phone]

## 2025-02-06 DIAGNOSIS — G56.00 CARPAL TUNNEL SYNDROME, UNSPECIFIED LATERALITY: Primary | ICD-10-CM

## 2025-02-10 NOTE — TELEPHONE ENCOUNTER
Caller: Russ    Doctor: Dr. CATALAN    Reason for call: Call Disconnected. Pt advised he was returning call to schedule. No recent notes to schedule with dr jain but there is a referral for ortho surgery from 02/06.     Call back#: [phone]

## 2025-03-03 ENCOUNTER — OFFICE VISIT (OUTPATIENT)
Dept: OBGYN CLINIC | Facility: CLINIC | Age: 75
End: 2025-03-03
Payer: MEDICARE

## 2025-03-03 VITALS — BODY MASS INDEX: 34.91 KG/M2 | WEIGHT: 272 LBS | HEIGHT: 74 IN

## 2025-03-03 DIAGNOSIS — G56.01 CARPAL TUNNEL SYNDROME ON RIGHT: Primary | ICD-10-CM

## 2025-03-03 PROCEDURE — 20526 THER INJECTION CARP TUNNEL: CPT | Performed by: ORTHOPAEDIC SURGERY

## 2025-03-03 PROCEDURE — 99203 OFFICE O/P NEW LOW 30 MIN: CPT | Performed by: ORTHOPAEDIC SURGERY

## 2025-03-03 RX ORDER — BETAMETHASONE SODIUM PHOSPHATE AND BETAMETHASONE ACETATE 3; 3 MG/ML; MG/ML
6 INJECTION, SUSPENSION INTRA-ARTICULAR; INTRALESIONAL; INTRAMUSCULAR; SOFT TISSUE
Status: COMPLETED | OUTPATIENT
Start: 2025-03-03 | End: 2025-03-03

## 2025-03-03 RX ORDER — LIDOCAINE HYDROCHLORIDE 10 MG/ML
1 INJECTION, SOLUTION INFILTRATION; PERINEURAL
Status: COMPLETED | OUTPATIENT
Start: 2025-03-03 | End: 2025-03-03

## 2025-03-03 RX ADMIN — BETAMETHASONE SODIUM PHOSPHATE AND BETAMETHASONE ACETATE 6 MG: 3; 3 INJECTION, SUSPENSION INTRA-ARTICULAR; INTRALESIONAL; INTRAMUSCULAR; SOFT TISSUE at 13:00

## 2025-03-03 RX ADMIN — LIDOCAINE HYDROCHLORIDE 1 ML: 10 INJECTION, SOLUTION INFILTRATION; PERINEURAL at 13:00

## 2025-03-03 NOTE — PROGRESS NOTES
ORTHOPAEDIC HAND, WRIST, AND ELBOW OFFICE  VISIT     Name: Russ Hill      : 1950      MRN: [id number]  Encounter Provider: Marina Martinez MD  Encounter Date: 3/3/2025   Encounter department: [facility]  :  Assessment & Plan  Carpal tunnel syndrome on right  Subjective history, clinical exam, and diagnostic studies reviewed with patient  Diagnosis discussed  Treatment options discussed which include nonoperative and operative management.  We discussed use of splinting, therapy, activity modification, use of NSAIDs (oral and topical), and injections. We discussed risks and benefits of each and well as expected reasonable outcomes.  Surgery can be considered following unsuccessful treatment with nonoperative management.    The patient was given the opportunity to ask questions.  Questions were answered to the patient's satisfaction.  The patient decided to move forward with cortisone injection of carpal tunnel via shared decision making.  Follow up in 8 weeks.              General Discussions:  Carpal Tunnel Syndrome: The anatomy and physiology of carpal tunnel syndrome was discussed with the patient today.  Increase pressure localized under the transverse carpal ligament can cause pain, numbness, tingling, or dysesthesias within the median nerve distribution as well as feelings of fatigue, clumsiness, or awkwardness.  These symptoms typically occur at night and worse in the morning upon waking.  Eventually, untreated carpal tunnel syndrome can result in weakness and permanent loss of muscle within the thenar compartment of the hand.  Treatment options were discussed with the patient.  Conservative treatment includes nocturnal resting splints to keep the nerve in a neutral position, ergonomic changes within the work or home environment, activity modification, and tendon gliding exercises.  Steroid injections within the carpal canal can help a majority of  "patients, however this is often self-limited in a majority of patients.  Surgical intervention to divide the transverse carpal ligament typically results in a long-lasting relief of the patient's complaints, with the recurrence rate of less than 1%.          History of Present Illness   HPI  Chief Complaint   Patient presents with    Right Hand - Numbness     Hands always cold       Russ Hill is a 74 y.o. male who presents presents today for evaluation for right sided carpal tunnel syndrome. He reports having numbness and tingling in the long and ring fingers of the right hand with mild numbness and tingling in the thumb and index fingers. He notes this has been ongoing for the last 2 years. He reports the numbness and tingling does not always wake him up at night.     Hand dominance: right    REVIEW OF SYSTEMS:  General: no fever, no chills  HEENT:  No loss of hearing or eyesight problems  Eyes:  No red eyes  Respiratory:  No coughing, shortness of breath or wheezing  Cardiovascular:  No chest pain, no palpitations  GI:  Abdomen soft nontender, denies nausea  Endocrine:  No muscle weakness, no frequent urination, no excessive thirst  Urinary:  No dysuria, no incontinence  Musculoskeletal: see HPI and PE  SKIN:  No skin rash, no dry skin  Neurological:  No headaches, no confusion  Psychiatric:  No suicide thoughts, no anxiety, no depression  Review of all other systems is negative    Objective   Ht 6' 2\" (1.88 m)   Wt 123 kg (272 lb)   BMI 34.92 kg/m²      General: well developed and well nourished, alert, oriented times 3, and appears comfortable  Psychiatric: Normal  HEENT: Trachea Midline, No torticollis  Cardiovascular: No discernable arrhythmia  Pulmonary: No wheezing or stridor  Abdomen: No rebound or guarding  Extremities: No peripheral edema  Skin: No masses, erythema, lacerations, fluctation, ulcerations  Neurovascular: Sensation Intact to the Median, Ulnar, Radial Nerve, Motor Intact to the Median, " Ulnar, Radial Nerve, and Pulses Intact    Musculoskeletal exam:  Examination of the affected extremity was compared to the unaffected extremity.  Skin was intact.  No swelling or ecchymosis.  No deformity.  Hand and fingers were warm and well-perfused.  Capillary refill was brisk.  Full active range of motion of the elbows, forearms, wrists, and fingers.  5/5 elbow flexors/extensors, wrist flexor/extensors, and .      Sensation was not decreased in the median nerve distribution.  Sensation was not decreased in the ulnar nerve distribution.  There was not APB atrophy.  There was no intrinsic atrophy. Tinel's at the wrist was Negative. Tinel's at the elbow was Negative. Wrist flexion compression was negative. Evaluation for lacertus syndrome was Negative.     Thumb of right hand:  Shoulder sign  Pseudo atrophy    STUDIES REVIEWED:  Ultrasound was reviewed in PACS by Dr. Martinez and demonstrate: suspicion for carpal tunnel syndrome, right.       PROCEDURES PERFORMED:  Hand/upper extremity injection: R carpal tunnel  [place] Protocol:  Consent: Verbal consent obtained.  Risks and benefits: risks, benefits and alternatives were discussed  Consent given by: patient  Patient understanding: patient states understanding of the procedure being performed  Patient identity confirmed: verbally with patient  Supporting Documentation  Indications: diagnostic and therapeutic   Procedure Details  Condition:carpal tunnel syndrome Site: R carpal tunnel   Needle size: 25 G  Ultrasound guidance: no  Approach: volar  Medications administered: 1 mL lidocaine 1 %; 6 mg betamethasone acetate-betamethasone sodium phosphate 6 (3-3) mg/mL  Patient tolerance: patient tolerated the procedure well with no immediate complications  Dressing:  Sterile dressing applied               Scribe Attestation      I,:  Paul Pham am acting as a scribe while in the presence of the attending physician.:       I,:  Marina Martinez MD  personally performed the services described in this documentation    as scribed in my presence.:

## 2025-04-29 VITALS — WEIGHT: 272 LBS | HEIGHT: 74 IN | BODY MASS INDEX: 34.91 KG/M2

## 2025-04-29 DIAGNOSIS — G56.01 CARPAL TUNNEL SYNDROME ON RIGHT: Primary | ICD-10-CM

## 2025-04-29 PROCEDURE — 99213 OFFICE O/P EST LOW 20 MIN: CPT | Performed by: ORTHOPAEDIC SURGERY

## 2025-04-29 NOTE — ASSESSMENT & PLAN NOTE
Subjective history, clinical exam, and diagnostic studies reviewed with patient  Diagnosis discussed.  Patient experienced no relief of symptoms following CSI. Suspect the symptoms are most likely related to c-spine pathology.    Patient is scheduled for electrodiagnostic study   The patient was given the opportunity to ask questions.  Questions were answered to the patient's satisfaction.  The patient decided to move forward with continuation of treatment with spine and pain and proceed with EMG via shared decision making.  Follow up as needed

## 2025-04-29 NOTE — PROGRESS NOTES
ORTHOPAEDIC HAND, WRIST, AND ELBOW OFFICE  VISIT     Name: Russ Hill      : 1950      MRN: [id number]  Encounter Provider: Marina Martinez MD  Encounter Date: 2025   Encounter department: [facility]  :  Assessment & Plan  Carpal tunnel syndrome on right  Subjective history, clinical exam, and diagnostic studies reviewed with patient  Diagnosis discussed.  Patient experienced no relief of symptoms following CSI. Suspect the symptoms are most likely related to c-spine pathology.    Patient is scheduled for electrodiagnostic study   The patient was given the opportunity to ask questions.  Questions were answered to the patient's satisfaction.  The patient decided to move forward with continuation of treatment with spine and pain and proceed with EMG via shared decision making.  Follow up as needed           History of Present Illness   HPI  Chief Complaint   Patient presents with    Right Wrist - Follow-up, Numbness, Tingling       Russ Hill is a 74 y.o. male who presents to the office for follow up of right carpal tunnel syndrome. At last visit he received a cortisone injection to the carpal tunnel which provided him no relief. He has continued numbness and tingling in the long and ring fingers of the right hand with mild numbness and tingling in the thumb and index fingers. He is scheduled for an EMG coming up and is treating with spine and pain.    Hand dominance: Right    REVIEW OF SYSTEMS:  General: no fever, no chills  HEENT:  No loss of hearing or eyesight problems  Eyes:  No red eyes  Respiratory:  No coughing, shortness of breath or wheezing  Cardiovascular:  No chest pain, no palpitations  GI:  Abdomen soft nontender, denies nausea  Endocrine:  No muscle weakness, no frequent urination, no excessive thirst  Urinary:  No dysuria, no incontinence  Musculoskeletal: see HPI and PE  SKIN:  No skin rash, no dry skin  Neurological:  No headaches, no  "confusion  Psychiatric:  No suicide thoughts, no anxiety, no depression  Review of all other systems is negative    Objective   Ht 6' 2\" (1.88 m)   Wt 123 kg (272 lb)   BMI 34.92 kg/m²      General: well developed and well nourished, alert, oriented times 3, and appears comfortable  Psychiatric: Normal  HEENT: Trachea Midline, No torticollis  Cardiovascular: No discernable arrhythmia  Pulmonary: No wheezing or stridor  Abdomen: No rebound or guarding  Extremities: No peripheral edema  Skin: No masses, erythema, lacerations, fluctation, ulcerations  Neurovascular: Sensation Intact to the Median, Ulnar, Radial Nerve, Motor Intact to the Median, Ulnar, Radial Nerve, and Pulses Intact    Musculoskeletal exam:  Examination of the affected extremity was compared to the unaffected extremity.  Skin was intact.  No swelling or ecchymosis.  No deformity.  Hand and fingers were warm and well-perfused.  Capillary refill was brisk.  Full active range of motion of the elbows, forearms, wrists, and fingers.  5/5 elbow flexors/extensors, wrist flexor/extensors, and .       Sensation was not decreased in the median nerve distribution.  Sensation was not decreased in the ulnar nerve distribution.  There was not APB atrophy.  There was no intrinsic atrophy. Tinel's at the wrist was Negative. Tinel's at the elbow was Negative. Wrist flexion compression was negative. Evaluation for lacertus syndrome was Negative.      Thumb of right hand:  Shoulder sign  Pseudo atrophy      STUDIES REVIEWED:  No Studies to review      PROCEDURES PERFORMED:  Procedures  No Procedures performed today     Scribe Attestation      I,:  Vickie Hammonds am acting as a scribe while in the presence of the attending physician.:       I,:  Marina Martinez MD personally performed the services described in this documentation    as scribed in my presence.:             "

## 2025-05-20 ENCOUNTER — HOSPITAL ENCOUNTER (OUTPATIENT)
Dept: NEUROLOGY | Facility: CLINIC | Age: 75
Discharge: HOME | End: 2025-05-20
Payer: MEDICARE

## 2025-05-20 ENCOUNTER — TELEPHONE (OUTPATIENT)
Dept: PAIN MEDICINE | Facility: CLINIC | Age: 75
End: 2025-05-20

## 2025-05-20 DIAGNOSIS — R20.2 NUMBNESS AND TINGLING IN RIGHT HAND: ICD-10-CM

## 2025-05-20 DIAGNOSIS — R20.0 NUMBNESS AND TINGLING IN RIGHT HAND: ICD-10-CM

## 2025-05-20 PROCEDURE — 95886 MUSC TEST DONE W/N TEST COMP: CPT | Performed by: PSYCHIATRY & NEUROLOGY

## 2025-05-20 PROCEDURE — 95909 NRV CNDJ TST 5-6 STUDIES: CPT | Performed by: PSYCHIATRY & NEUROLOGY

## 2025-05-20 NOTE — TELEPHONE ENCOUNTER
----- Message from Jeffrey Cordero DO sent at 5/20/2025  9:30 AM EDT -----  Schedule follow-up appointment with NP/PA to review EMG and discuss neck step of treatment plan

## 2025-06-04 ENCOUNTER — OFFICE VISIT (OUTPATIENT)
Dept: PAIN MEDICINE | Facility: CLINIC | Age: 75
End: 2025-06-04
Payer: MEDICARE

## 2025-06-04 VITALS
WEIGHT: 274 LBS | HEART RATE: 74 BPM | OXYGEN SATURATION: 93 % | BODY MASS INDEX: 35.16 KG/M2 | TEMPERATURE: 98 F | HEIGHT: 74 IN

## 2025-06-04 DIAGNOSIS — G56.01 CARPAL TUNNEL SYNDROME OF RIGHT WRIST: Primary | ICD-10-CM

## 2025-06-04 DIAGNOSIS — M54.12 CERVICAL RADICULOPATHY: ICD-10-CM

## 2025-06-04 DIAGNOSIS — M48.02 CERVICAL SPINAL STENOSIS: ICD-10-CM

## 2025-06-04 PROCEDURE — 99213 OFFICE O/P EST LOW 20 MIN: CPT | Performed by: PHYSICIAN ASSISTANT

## 2025-06-04 PROCEDURE — G2211 COMPLEX E/M VISIT ADD ON: HCPCS | Performed by: PHYSICIAN ASSISTANT

## 2025-06-04 NOTE — PROGRESS NOTES
Name: Russ Hill      : 1950      MRN: [id number]  Encounter Provider: Harriett Russo PA-C  Encounter Date: 2025   Encounter department: [facility]  :  Assessment & Plan  Carpal tunnel syndrome of right wrist  While the patient was in the office today, I did have a thorough conversation regarding their chronic pain syndrome, medication management, and treatment plan options.    EMG reviewed with the patient on today's visit.  Patient would like to follow-up with Dr. Martinez and have a discussion regarding carpal tunnel release surgery as this is obviously at the less invasive surgery compared to the spine.       Cervical spinal stenosis  As the patient did not respond to a cervical epidural steroid injection, no further injections are recommended.  Patient will follow-up with Dr. Chawla from [facility] to discuss surgical intervention, based upon if carpal tunnel release surgery is recommended or not.    Follow-up is planned as needed or sooner as warranted. The patient was advised to contact the office should their symptoms worsen in the interim.         Cervical radiculopathy         My impressions and treatment recommendations were discussed in detail with the patient who verbalized understanding and had no further questions.  Discharge instructions were provided. I personally saw and examined the patient and I agree with the above discussed plan of care.    History of Present Illness     Russ Hill is a 74 y.o. male who presents for a follow up office visit in regards to Hand Pain. The patient’s current symptoms include hand numbness and weakness.  He has intermittent burning type of pain in his right hand as well as his lower extremities that he rates a 5 out of 10.  Patient had a cervical spine MRI done last year which revealed severe spinal stenosis/foraminal stenosis.  Unfortunately on epidurals or injections did not improve any of his symptoms.  Patient also  "underwent a carpal tunnel injection after [place] with no improvement.  An ultrasound revealed finding suspicious for carpal tunnel syndrome and an EMG/ nerve conduction study of the right arm reveal a median mononeuropathy at the right wrist compatible with the clinical diagnosis of carpal tunnel syndrome. There is an underlying chronic right C6-7 radiculopathy    Review of Systems   Respiratory:  Negative for shortness of breath.    Cardiovascular:  Negative for chest pain.   Gastrointestinal:  Negative for constipation, diarrhea, nausea and vomiting.   Musculoskeletal:  Negative for arthralgias, gait problem, joint swelling and myalgias.   Skin:  Negative for rash.   Neurological:  Negative for dizziness, seizures and weakness.   Psychiatric/Behavioral:  Negative for dysphoric mood.    All other systems reviewed and are negative.      Medical History Reviewed by provider this encounter:  Tobacco  Allergies  Meds  Problems  Med Hx  Surg Hx  Fam Hx     .  Objective   Pulse 74   Temp 98 °F (36.7 °C)   Ht 6' 2\" (1.88 m)   Wt 124 kg (274 lb)   SpO2 93%   BMI 35.18 kg/m²      Pain Score:   5  Physical Exam  Constitutional: normal, well developed, well nourished, alert, in no distress and non-toxic and no overt pain behavior.  Eyes: anicteric  HEENT: grossly intact  Neck: supple, symmetric, trachea midline and no masses   Pulmonary: even and unlabored  Cardiovascular: No edema or pitting edema present  Skin: Normal without rashes or lesions and well hydrated  Psychiatric: Mood and affect appropriate  Neurologic: Cranial Nerves II-XII grossly intact  Musculoskeletal: normal          "

## 2025-06-04 NOTE — ASSESSMENT & PLAN NOTE
While the patient was in the office today, I did have a thorough conversation regarding their chronic pain syndrome, medication management, and treatment plan options.    EMG reviewed with the patient on today's visit.  Patient would like to follow-up with Dr. Martinez and have a discussion regarding carpal tunnel release surgery as this is obviously at the less invasive surgery compared to the spine.

## 2025-06-10 ENCOUNTER — RESULTS FOLLOW-UP (OUTPATIENT)
Dept: ENDOCRINOLOGY | Facility: CLINIC | Age: 75
End: 2025-06-10

## 2025-06-10 LAB
T4 FREE SERPL-MCNC: 1.8 NG/DL (ref 0.8–1.8)
THYROGLOB AB SERPL-ACNC: <0.1 NG/ML
THYROGLOB AB SERPL-ACNC: <1 IU/ML
TSH SERPL-ACNC: 0.33 MIU/L (ref 0.4–4.5)

## 2025-06-16 ENCOUNTER — OFFICE VISIT (OUTPATIENT)
Dept: ENDOCRINOLOGY | Facility: CLINIC | Age: 75
End: 2025-06-16
Payer: MEDICARE

## 2025-06-16 VITALS
SYSTOLIC BLOOD PRESSURE: 124 MMHG | DIASTOLIC BLOOD PRESSURE: 78 MMHG | HEIGHT: 74 IN | BODY MASS INDEX: 35.04 KG/M2 | WEIGHT: 273 LBS

## 2025-06-16 DIAGNOSIS — C73 PAPILLARY THYROID CARCINOMA (HCC): ICD-10-CM

## 2025-06-16 DIAGNOSIS — E89.0 POSTOPERATIVE HYPOTHYROIDISM: Primary | ICD-10-CM

## 2025-06-16 PROCEDURE — 99214 OFFICE O/P EST MOD 30 MIN: CPT | Performed by: INTERNAL MEDICINE

## 2025-06-16 PROCEDURE — G2211 COMPLEX E/M VISIT ADD ON: HCPCS | Performed by: INTERNAL MEDICINE

## 2025-06-16 RX ORDER — AMOXICILLIN 500 MG/1
TABLET, FILM COATED ORAL
COMMUNITY
Start: 2025-05-05

## 2025-06-16 RX ORDER — LEVOTHYROXINE SODIUM 200 MCG
TABLET ORAL
Qty: 90 TABLET | Refills: 3 | Status: SHIPPED | OUTPATIENT
Start: 2025-06-16

## 2025-06-16 NOTE — PROGRESS NOTES
Name: Russ Hill      : 1950      MRN: [id number]  Encounter Provider: Sha Gao DO  Encounter Date: 2025   Encounter department: [facility] [place]    No chief complaint on file.  :  Assessment & Plan  Postoperative hypothyroidism  TSH is slightly below goal but clinically he is euthyroid.  Is possible that some of his recent pain treatments and corticosteroid treatments may have suppressed his TSH.  We will repeat a TSH in about 2 months.  If it is still slightly below goal, then we will adjust his regimen slightly.  Orders:    Synthroid 200 MCG tablet; 1 tab 5 days of the week. 0.5 tabs Wednesday and 0.5 tab . BRAND NECESSARY.    TSH, 3rd generation; Future    T4, free; Future    TSH, 3rd generation; Future    T4, free; Future    Papillary thyroid carcinoma (HCC)  Thyroglobulin remains undetectable.  Monitor annually.  Orders:    Thyroglobulin; Future    Anti-thyroglobulin antibody; Future      Assessment & Plan        Pertinent Medical History   Russ has a history of thyroid cancer and postoperative hypothyroidism and follows with our office regularly.  In the past, he had an initial surgery for thyroid nodules with surgical pathology showing benign follicular adenoma. He underwent another evaluation and another thyroid nodule was found in the remaining portion of the thyroid that grew and on biopsy, it was concerning for papillary thyroid cancer. He underwent completion thyroidectomy in  with surgical pathology showing a 3.5 cm right lobe papillary thyroid cancer confined to the thyroid without angiolymphatic invasion and surgical margins were free of cancer. There was also a follicular adenoma and hyperplastic nodule in the specimen. The papillary thyroid cancer was classic variant. There was no capsular invasion or lymphovascular invasion noted on pathology. He then received 150 mCi of I-131. In 2011 at [facility], the  "patient had a post I131 whole body scan which showed residual thyroid tissue without any distant metastasis.         History of Present Illness   History of Present Illness    Russ Hill is a 74 y.o. male presents for a follow-up for history of thyroid cancer as well as postoperative hypothyroidism.  For hypothyroidism, he is maintained on Synthroid brand 200 mcg tablets and takes 1 tablet 5 days a week, half tablet once a day, half tablets on Sunday.  He presents today overall feeling well.  Continues to follow closely with pain management.  Being evaluated for possible carpal tunnel syndrome as well as neck radiculopathy.    Review of Systems   All other systems reviewed and are negative.   as per HPI       Medical History Reviewed by provider this encounter:     .    Objective   /78 (BP Location: Left arm, Patient Position: Sitting, Cuff Size: Adult)   Ht 6' 2\" (1.88 m)   Wt 124 kg (273 lb)   BMI 35.05 kg/m²      Body mass index is 35.05 kg/m².  Wt Readings from Last 3 Encounters:   06/16/25 124 kg (273 lb)   06/04/25 124 kg (274 lb)   04/29/25 123 kg (272 lb)     Physical Exam  Vitals reviewed.   Constitutional:       General: He is not in acute distress.     Appearance: He is well-developed. He is not diaphoretic.   HENT:      Head: Normocephalic and atraumatic.     Eyes:      Conjunctiva/sclera: Conjunctivae normal.      Pupils: Pupils are equal, round, and reactive to light.     Neck:      Thyroid: No thyromegaly.     Cardiovascular:      Rate and Rhythm: Normal rate and regular rhythm.   Pulmonary:      Effort: Pulmonary effort is normal. No respiratory distress.      Breath sounds: Normal breath sounds.   Abdominal:      General: Bowel sounds are normal.      Palpations: Abdomen is soft.     Musculoskeletal:         General: Normal range of motion.      Cervical back: Normal range of motion and neck supple.     Skin:     General: Skin is warm and dry.      Findings: No rash.     Neurological:      " Mental Status: He is alert and oriented to person, place, and time.      Motor: No abnormal muscle tone.     Psychiatric:         Behavior: Behavior normal.       Physical Exam      Results    Labs: I have reviewed pertinent labs including:         Component      Latest Ref Rng 6/9/2025   THYROGLOBULIN AB      < or = 1 IU/mL <1    THYROGLOBULIN      ng/mL <0.1 (L)    FREE T4      0.8 - 1.8 ng/dL 1.8    TSH, POC      0.40 - 4.50 mIU/L 0.33 (L)       Legend:  (L) Low  There are no Patient Instructions on file for this visit.    Discussed with the patient and all questioned fully answered. He will call me if any problems arise.

## 2025-06-16 NOTE — ASSESSMENT & PLAN NOTE
TSH is slightly below goal but clinically he is euthyroid.  Is possible that some of his recent pain treatments and corticosteroid treatments may have suppressed his TSH.  We will repeat a TSH in about 2 months.  If it is still slightly below goal, then we will adjust his regimen slightly.  Orders:    Synthroid 200 MCG tablet; 1 tab 5 days of the week. 0.5 tabs Wednesday and 0.5 tab Sunday. BRAND NECESSARY.    TSH, 3rd generation; Future    T4, free; Future    TSH, 3rd generation; Future    T4, free; Future    
Thyroglobulin remains undetectable.  Monitor annually.  Orders:    Thyroglobulin; Future    Anti-thyroglobulin antibody; Future    
verbal instruction

## 2025-08-02 LAB
T4 FREE SERPL-MCNC: 1.8 NG/DL (ref 0.8–1.8)
TSH SERPL-ACNC: 0.72 MIU/L (ref 0.4–4.5)